# Patient Record
Sex: FEMALE | Race: WHITE | NOT HISPANIC OR LATINO | Employment: FULL TIME
[De-identification: names, ages, dates, MRNs, and addresses within clinical notes are randomized per-mention and may not be internally consistent; named-entity substitution may affect disease eponyms.]

---

## 2017-07-01 ENCOUNTER — HEALTH MAINTENANCE LETTER (OUTPATIENT)
Age: 47
End: 2017-07-01

## 2018-03-28 ENCOUNTER — OFFICE VISIT (OUTPATIENT)
Dept: FAMILY MEDICINE | Facility: CLINIC | Age: 48
End: 2018-03-28
Payer: COMMERCIAL

## 2018-03-28 VITALS
DIASTOLIC BLOOD PRESSURE: 74 MMHG | BODY MASS INDEX: 28.65 KG/M2 | OXYGEN SATURATION: 95 % | HEIGHT: 64 IN | RESPIRATION RATE: 16 BRPM | SYSTOLIC BLOOD PRESSURE: 102 MMHG | WEIGHT: 167.8 LBS | HEART RATE: 84 BPM | TEMPERATURE: 98.3 F

## 2018-03-28 DIAGNOSIS — R23.2 HOT FLASHES: ICD-10-CM

## 2018-03-28 DIAGNOSIS — Z00.00 ENCOUNTER FOR ROUTINE ADULT HEALTH EXAMINATION WITHOUT ABNORMAL FINDINGS: ICD-10-CM

## 2018-03-28 DIAGNOSIS — Z23 NEED FOR PROPHYLACTIC VACCINATION WITH TETANUS-DIPHTHERIA (TD): Primary | ICD-10-CM

## 2018-03-28 DIAGNOSIS — R21 RASH: ICD-10-CM

## 2018-03-28 LAB
ALBUMIN SERPL-MCNC: 3.9 G/DL (ref 3.4–5)
ALP SERPL-CCNC: 64 U/L (ref 40–150)
ALT SERPL W P-5'-P-CCNC: 24 U/L (ref 0–50)
ANION GAP SERPL CALCULATED.3IONS-SCNC: 8 MMOL/L (ref 3–14)
AST SERPL W P-5'-P-CCNC: 17 U/L (ref 0–45)
BILIRUB SERPL-MCNC: 0.6 MG/DL (ref 0.2–1.3)
BUN SERPL-MCNC: 7 MG/DL (ref 7–30)
CALCIUM SERPL-MCNC: 8.3 MG/DL (ref 8.5–10.1)
CHLORIDE SERPL-SCNC: 106 MMOL/L (ref 94–109)
CHOLEST SERPL-MCNC: 156 MG/DL
CO2 SERPL-SCNC: 25 MMOL/L (ref 20–32)
CREAT SERPL-MCNC: 0.88 MG/DL (ref 0.52–1.04)
GFR SERPL CREATININE-BSD FRML MDRD: 68 ML/MIN/1.7M2
GLUCOSE SERPL-MCNC: 93 MG/DL (ref 70–99)
HDLC SERPL-MCNC: 55 MG/DL
LDLC SERPL CALC-MCNC: 87 MG/DL
NONHDLC SERPL-MCNC: 101 MG/DL
POTASSIUM SERPL-SCNC: 3.8 MMOL/L (ref 3.4–5.3)
PROT SERPL-MCNC: 7.4 G/DL (ref 6.8–8.8)
SODIUM SERPL-SCNC: 139 MMOL/L (ref 133–144)
TRIGL SERPL-MCNC: 71 MG/DL
TSH SERPL DL<=0.005 MIU/L-ACNC: 1.93 MU/L (ref 0.4–4)

## 2018-03-28 PROCEDURE — 90471 IMMUNIZATION ADMIN: CPT | Performed by: FAMILY MEDICINE

## 2018-03-28 PROCEDURE — 90715 TDAP VACCINE 7 YRS/> IM: CPT | Performed by: FAMILY MEDICINE

## 2018-03-28 PROCEDURE — 99213 OFFICE O/P EST LOW 20 MIN: CPT | Mod: 25 | Performed by: FAMILY MEDICINE

## 2018-03-28 PROCEDURE — 84443 ASSAY THYROID STIM HORMONE: CPT | Performed by: FAMILY MEDICINE

## 2018-03-28 PROCEDURE — 99396 PREV VISIT EST AGE 40-64: CPT | Mod: 25 | Performed by: FAMILY MEDICINE

## 2018-03-28 PROCEDURE — 36415 COLL VENOUS BLD VENIPUNCTURE: CPT | Performed by: FAMILY MEDICINE

## 2018-03-28 PROCEDURE — 80053 COMPREHEN METABOLIC PANEL: CPT | Performed by: FAMILY MEDICINE

## 2018-03-28 PROCEDURE — 80061 LIPID PANEL: CPT | Performed by: FAMILY MEDICINE

## 2018-03-28 RX ORDER — EMOLLIENT BASE
CREAM (GRAM) TOPICAL PRN
Qty: 100 G | Refills: 1 | Status: SHIPPED | OUTPATIENT
Start: 2018-03-28 | End: 2021-12-20

## 2018-03-28 RX ORDER — MUPIROCIN 20 MG/G
OINTMENT TOPICAL
Qty: 30 G | Refills: 1 | Status: SHIPPED | OUTPATIENT
Start: 2018-03-28 | End: 2018-08-28

## 2018-03-28 NOTE — NURSING NOTE
"Chief Complaint   Patient presents with     Physical     Initial /74  Pulse 84  Temp 98.3  F (36.8  C) (Tympanic)  Resp 16  Ht 5' 4.01\" (1.626 m)  Wt 167 lb 12.8 oz (76.1 kg)  LMP 03/28/2017 (Approximate)  SpO2 95%  Breastfeeding? No  BMI 28.79 kg/m2 Estimated body mass index is 28.79 kg/(m^2) as calculated from the following:    Height as of this encounter: 5' 4.01\" (1.626 m).    Weight as of this encounter: 167 lb 12.8 oz (76.1 kg).  BP completed using cuff size: regular. L  arm       Health Maintenance that is potentially due pending provider review:   NONE       Haleigh Mueller CMA     "

## 2018-03-28 NOTE — PROGRESS NOTES
SUBJECTIVE:   CC: David Oro is an 47 year old woman who presents for preventive health visit.     Physical   Annual:     Getting at least 3 servings of Calcium per day::  Yes    Bi-annual eye exam::  Yes    Dental care twice a year::  Yes    Sleep apnea or symptoms of sleep apnea::  None and Daytime drowsiness    Diet::  Regular (no restrictions)    Frequency of exercise::  1 day/week    Duration of exercise::  Less than 15 minutes    Taking medications regularly::  Yes    Medication side effects::  Not applicable    Additional concerns today::  YES              Rash on the dorsal part of her hands gotten worse in the winter , she does have a dry skin and bad on the back of hands   She has been using lotions and now recently started Neosporin Abx not getting better   Does not itch but does get red and painful at times  Worse on the left hand dorsum   2) hot flashes , she does have a GYN where she does her PAP and breast exam and also a mammogram, all normal and done in 2016 so not due until 2019   She had a LMP of March 2017 and since it has been a yr she is done , she does have hot flashes when she is stressed out     Today's PHQ-2 Score:   PHQ-2 ( 1999 [employer]) 11/16/2016   Q1: Little interest or pleasure in doing things 0   Q2: Feeling down, depressed or hopeless 0   PHQ-2 Score 0       Abuse: Current or Past(Physical, Sexual or Emotional)- No  Do you feel safe in your environment - Yes    Social History   Substance Use Topics     Smoking status: Former Smoker     Quit date: 1/1/2000     Smokeless tobacco: Never Used     Alcohol use 0.0 oz/week     0 Standard drinks or equivalent per week      Comment: 1x month      No flowsheet data found.    Reviewed orders with patient.  Reviewed health maintenance and updated orders accordingly - Yes  Labs reviewed in EPIC  BP Readings from Last 3 Encounters:   03/28/18 102/74   12/02/16 96/68   02/18/14 116/76    Wt Readings from Last 3 Encounters:    03/28/18 167 lb 12.8 oz (76.1 kg)   12/02/16 147 lb 9.6 oz (67 kg)   02/18/14 161 lb (73 kg)                  Patient Active Problem List   Diagnosis     Tremor of both hands     Hot flashes     Chronic idiopathic constipation     Past Surgical History:   Procedure Laterality Date     dilation and curretage  8/2/04    UTERINE POLYP REMOVED; PATH: BENIGN POLYP,PROLIFERATIVE ENDOMETRIUM     ENDOMETRIAL SAMPLING (BIOPSY)  9/21/10    SECRETORY ENDOMETRIUM     HYSTEROSCOPY  8/2/04    UTERINE POLYP REMOVED       Social History   Substance Use Topics     Smoking status: Former Smoker     Quit date: 1/1/2000     Smokeless tobacco: Never Used     Alcohol use 0.0 oz/week     0 Standard drinks or equivalent per week      Comment: 1x month      Family History   Problem Relation Age of Onset     DIABETES Paternal Grandmother      Coronary Artery Disease Paternal Grandmother      Hypertension Paternal Grandmother      Hypertension Mother      OSTEOPOROSIS Mother      Hypertension Father      Hypertension Maternal Grandfather      Hypertension Paternal Grandfather      Other Cancer Maternal Grandmother      cervical     Uterine Cancer Maternal Grandmother          Current Outpatient Prescriptions   Medication Sig Dispense Refill     emollient (VANICREAM) cream Apply topically as needed for other 100 g 1     mupirocin (BACTROBAN) 2 % ointment Apply three times daily on the rash for five to 7 days 30 g 1     No Known Allergies  Recent Labs   Lab Test  11/16/16   1356   LDL  63   HDL  69   TRIG  72   CR  0.80   GFRESTIMATED  76   GFRESTBLACK  >90   GFR Calc     POTASSIUM  3.9   TSH  1.91        Patient under age 50, mutual decision reflected in health maintenance.      Pertinent mammograms are reviewed under the imaging tab.  History of abnormal Pap smear: NO - age 30- 65 PAP every 3 years recommended    Reviewed and updated as needed this visit by clinical staff         Reviewed and updated as needed this visit by  Provider        Past Medical History:   Diagnosis Date     Chronic idiopathic constipation 12/2/2016     DUB (dysfunctional uterine bleeding) 2010     Hot flashes 12/2/2016     Leiomyoma of uterus 2/26/09    POSTERIOR FIBROID 1.5 X 1.1 X 0.8 CM     RhD negative      Tremor of both hands 12/2/2016     Uterine polyp 8/2/04    HAD A HYSTEROSCOPY , D AND C FOR A UTERINE POLYP      Past Surgical History:   Procedure Laterality Date     dilation and curretage  8/2/04    UTERINE POLYP REMOVED; PATH: BENIGN POLYP,PROLIFERATIVE ENDOMETRIUM     ENDOMETRIAL SAMPLING (BIOPSY)  9/21/10    SECRETORY ENDOMETRIUM     HYSTEROSCOPY  8/2/04    UTERINE POLYP REMOVED       Review of Systems  C: NEGATIVE for fever, chills, change in weight  I: NEGATIVE for worrisome rashes, moles or lesions  E: NEGATIVE for vision changes or irritation  ENT: NEGATIVE for ear, mouth and throat problems  R: NEGATIVE for significant cough or SOB  B: NEGATIVE for masses, tenderness or discharge  CV: NEGATIVE for chest pain, palpitations or peripheral edema  GI: NEGATIVE for nausea, abdominal pain, heartburn, or change in bowel habits  : NEGATIVE for unusual urinary or vaginal symptoms. No vaginal bleeding.  M: NEGATIVE for significant arthralgias or myalgia  N: NEGATIVE for weakness, dizziness or paresthesias  P: NEGATIVE for changes in mood or affect      OBJECTIVE:   There were no vitals taken for this visit.  Physical Exam  GENERAL: healthy, alert and no distress  EYES: Eyes grossly normal to inspection, PERRL and conjunctivae and sclerae normal  HENT: ear canals and TM's normal, nose and mouth without ulcers or lesions  NECK: no adenopathy, no asymmetry, masses, or scars and thyroid normal to palpation  RESP: lungs clear to auscultation - no rales, rhonchi or wheezes  CV: regular rate and rhythm, normal S1 S2, no S3 or S4, no murmur, click or rub, no peripheral edema and peripheral pulses strong  ABDOMEN: soft, nontender, no hepatosplenomegaly, no  "masses and bowel sounds normal  MS: no gross musculoskeletal defects noted, no edema  NEURO: Normal strength and tone, mentation intact and speech normal  PSYCH: mentation appears normal, affect normal/bright  LYMPH: no cervical, supraclavicular, axillary, or inguinal adenopathy    ASSESSMENT/PLAN:   1. Encounter for routine adult health examination without abnormal findings  Discussed diet,calcium,exercise.Went over self breast exam.Thin prep was  NOT done.Eyes and teeth UTD.No immunizations needed today.See orders below for tests ordered and screening needed.    - Lipid Profile (Chol, Trig, HDL, LDL calc)  - TSH with free T4 reflex  - TDAP VACCINE (ADACEL)    2. Need for prophylactic vaccination with tetanus-diphtheria (TD)  She got her tetanus shot today .    3. Rash  We discussed using Vanicream for the lubrication after bath and also daily or up to twice a day , then using the topical Bactroban over the rash two to three x a day and if not better in 7 to 10 days to let me know then I will rx a TMC cream  - emollient (VANICREAM) cream; Apply topically as needed for other  Dispense: 100 g; Refill: 1  - mupirocin (BACTROBAN) 2 % ointment; Apply three times daily on the rash for five to 7 days  Dispense: 30 g; Refill: 1    4. Hot flashes  Still there and certain occasions will trigger them when she is stressed out usually , but LMP was over a yr ago , March of 2017  So she is menopausal   - Comprehensive metabolic panel (BMP + Alb, Alk Phos, ALT, AST, Total. Bili, TP)    COUNSELING:  Reviewed preventive health counseling, as reflected in patient instructions       Regular exercise       Healthy diet/nutrition       Vision screening       Osteoporosis Prevention/Bone Health       (Hilary)menopause management         reports that she quit smoking about 18 years ago. She has never used smokeless tobacco.    Estimated body mass index is 25.74 kg/(m^2) as calculated from the following:    Height as of 12/2/16: 5' 3.5\" " (1.613 m).    Weight as of 12/2/16: 147 lb 9.6 oz (67 kg).       Counseling Resources:  ATP IV Guidelines  Pooled Cohorts Equation Calculator  Breast Cancer Risk Calculator  FRAX Risk Assessment  ICSI Preventive Guidelines  Dietary Guidelines for Americans, 2010  USDA's MyPlate  ASA Prophylaxis  Lung CA Screening    Kenna Soto MD  [facility]  Answers for HPI/ROS submitted by the patient on 3/28/2018   PHQ-2 Score: 0

## 2018-05-02 ENCOUNTER — RADIANT APPOINTMENT (OUTPATIENT)
Dept: MAMMOGRAPHY | Facility: CLINIC | Age: 48
End: 2018-05-02
Payer: COMMERCIAL

## 2018-05-02 ENCOUNTER — OFFICE VISIT (OUTPATIENT)
Dept: OBGYN | Facility: CLINIC | Age: 48
End: 2018-05-02
Payer: COMMERCIAL

## 2018-05-02 VITALS
BODY MASS INDEX: 29.88 KG/M2 | DIASTOLIC BLOOD PRESSURE: 84 MMHG | HEIGHT: 64 IN | WEIGHT: 175 LBS | SYSTOLIC BLOOD PRESSURE: 122 MMHG

## 2018-05-02 DIAGNOSIS — Z12.31 VISIT FOR SCREENING MAMMOGRAM: ICD-10-CM

## 2018-05-02 DIAGNOSIS — Z01.419 ENCOUNTER FOR GYNECOLOGICAL EXAMINATION WITHOUT ABNORMAL FINDING: Primary | ICD-10-CM

## 2018-05-02 DIAGNOSIS — R63.5 WEIGHT GAIN: ICD-10-CM

## 2018-05-02 DIAGNOSIS — R23.2 HOT FLASHES: ICD-10-CM

## 2018-05-02 PROCEDURE — 87624 HPV HI-RISK TYP POOLED RSLT: CPT | Performed by: OBSTETRICS & GYNECOLOGY

## 2018-05-02 PROCEDURE — 99396 PREV VISIT EST AGE 40-64: CPT | Performed by: OBSTETRICS & GYNECOLOGY

## 2018-05-02 PROCEDURE — 77063 BREAST TOMOSYNTHESIS BI: CPT | Mod: TC

## 2018-05-02 PROCEDURE — 77067 SCR MAMMO BI INCL CAD: CPT | Mod: TC

## 2018-05-02 PROCEDURE — G0145 SCR C/V CYTO,THINLAYER,RESCR: HCPCS | Performed by: OBSTETRICS & GYNECOLOGY

## 2018-05-02 ASSESSMENT — ANXIETY QUESTIONNAIRES
GAD7 TOTAL SCORE: 4
2. NOT BEING ABLE TO STOP OR CONTROL WORRYING: SEVERAL DAYS
1. FEELING NERVOUS, ANXIOUS, OR ON EDGE: SEVERAL DAYS
5. BEING SO RESTLESS THAT IT IS HARD TO SIT STILL: NOT AT ALL
7. FEELING AFRAID AS IF SOMETHING AWFUL MIGHT HAPPEN: NOT AT ALL
IF YOU CHECKED OFF ANY PROBLEMS ON THIS QUESTIONNAIRE, HOW DIFFICULT HAVE THESE PROBLEMS MADE IT FOR YOU TO DO YOUR WORK, TAKE CARE OF THINGS AT HOME, OR GET ALONG WITH OTHER PEOPLE: NOT DIFFICULT AT ALL
6. BECOMING EASILY ANNOYED OR IRRITABLE: SEVERAL DAYS
3. WORRYING TOO MUCH ABOUT DIFFERENT THINGS: SEVERAL DAYS

## 2018-05-02 ASSESSMENT — PATIENT HEALTH QUESTIONNAIRE - PHQ9: 5. POOR APPETITE OR OVEREATING: NOT AT ALL

## 2018-05-02 NOTE — MR AVS SNAPSHOT
"              After Visit Summary   5/2/2018    David Oro    MRN: [id number]           Patient Information     Date Of Birth          1970        Visit Information        Provider Department      5/2/2018 3:00 PM Becky Galvez MD [facility]        Today's Diagnoses     Encounter for gynecological examination without abnormal finding    -  1    Hot flashes        Weight gain           Follow-ups after your visit        Who to contact     If you have questions or need follow up information about today's clinic visit or your schedule please contact [facility] directly at [phone].  Normal or non-critical lab and imaging results will be communicated to you by MyChart, letter or phone within 4 business days after the clinic has received the results. If you do not hear from us within 7 days, please contact the clinic through [employer] or phone. If you have a critical or abnormal lab result, we will notify you by phone as soon as possible.  Submit refill requests through [employer] or call your pharmacy and they will forward the refill request to us. Please allow 3 business days for your refill to be completed.          Additional Information About Your Visit        MyChart Information     [employer] gives you secure access to your electronic health record. If you see a primary care provider, you can also send messages to your care team and make appointments. If you have questions, please call your primary care clinic.  If you do not have a primary care provider, please call [phone] and they will assist you.        Care EveryWhere ID     This is your Care EveryWhere ID. This could be used by other organizations to access your [place] medical records  [id number]        Your Vitals Were     Height Last Period Breastfeeding? BMI (Body Mass Index)          5' 4\" (1.626 m) 08/01/2017 (Approximate) No 30.04 kg/m2         Blood Pressure from Last 3 Encounters: "   05/02/18 122/84   03/28/18 102/74   12/02/16 96/68    Weight from Last 3 Encounters:   05/02/18 175 lb (79.4 kg)   03/28/18 167 lb 12.8 oz (76.1 kg)   12/02/16 147 lb 9.6 oz (67 kg)              We Performed the Following     HPV High Risk Types DNA Cervical     Pap imaged thin layer screen with HPV - recommended age 30 - 65        Primary Care Provider Office Phone # Fax #    Kenna Soto [phone]       [address] [facility] [place] [id number]        Equal Access to Services     [facility] TRAV : Hadii teresa Weston, wasadeda gwendolyn, qaybta kaalmada gurjit, pierce lewis . [facility] [place] -[phone].    ATENCIÓN: Si habla español, tiene a colón disposición servicios gratuitos de asistencia lingüística. [facility] [phone].    We comply with applicable federal civil rights laws and [place] laws. We do not discriminate on the basis of race, color, national origin, age, disability, sex, sexual orientation, or gender identity.            Thank you!     Thank you for choosing [facility]  for your care. Our goal is always to provide you with excellent care. Hearing back from our patients is one way we can continue to improve our services. Please take a few minutes to complete the written survey that you may receive in the mail after your visit with us. Thank you!             Your Updated Medication List - Protect others around you: Learn how to safely use, store and throw away your medicines at www.disposemymeds.org.          This list is accurate as of 5/2/18 11:59 PM.  Always use your most recent med list.                   Brand Name Dispense Instructions for use Diagnosis    emollient cream     100 g    Apply topically as needed for other    Rash       mupirocin 2 % ointment    BACTROBAN    30 g    Apply three times daily on the rash for five to 7 days    Rash

## 2018-05-02 NOTE — LETTER
May 15, 2018    David Oro  [address] [place] [address]    Dear David,  We are happy to inform you that your PAP smear result from 5/2/18 is normal.  We are now able to do a follow up test on PAP smears. The DNA test is for HPV (Human Papilloma Virus). Cervical cancer is closely linked with certain types of HPV. Your results showed no evidence of high risk HPV.  Therefore we recommend you return in 3 years for your next pap smear.  You will still need to return to the clinic every year for an annual exam and other preventive tests.  Please contact the clinic at [phone] with any questions.  Sincerely,    Becky Galvez MD/kelsey

## 2018-05-02 NOTE — PROGRESS NOTES
David is a 47 year old  female who presents for annual exam.     Besides routine health maintenance, she has no other health concerns today .    HPI:  The patient's PCP is Kenna Soto MD.    Patient is doing well overall but has gained quite a bit of weight in the last year. Almost 20#  Periods have also been very rare in this last year. Had one in April just last month but prior to that was 2017. Prior to that were maybe every 3 months or so  Getting some hotflashes. Definitely notices them if she's exerting herself or if she gets stressed. Can usually resolve within just a few seconds. Tolerable. Not many at night. No vaginal dryness issues.    Just had fasting labs with PCP a month or so ago and despite weight gain lipids and glucose look great    Has mammo today    Daughter is in 10th grade. About to get her license. Is doing really well and a good kid      GYNECOLOGIC HISTORY:    Patient's last menstrual period was 2017 (approximate). 2018  Her current contraception method is: none.  She  reports that she quit smoking about 18 years ago. She has never used smokeless tobacco.    Patient is sexually active.  STD testing offered?  Declined  Last PHQ-9 score on record =   PHQ-9 SCORE 2018   Total Score 5     Last GAD7 score on record =   JUAN ALBERTO-7 SCORE 2018   Total Score 4     Alcohol Score = 1    HEALTH MAINTENANCE:  Cholesterol:   Cholesterol   Date Value Ref Range Status   2018 156 <200 mg/dL Final   2016 146 <200 mg/dL Final    3/28/18 Total= 156, Triglycerides=71, HDL=55, LDL=87, FBS=93, TSH=1.93  Last Mammo: one year ago, Result: normal, Next Mammo: today   Pap: (  Lab Results   Component Value Date    PAP Negative 2014 WNL HPV (-)neg  Colonoscopy:  NA, Result: not applicable, Next Colonoscopy: NA years.  Dexa:  NA    Health maintenance updated:  yes    HISTORY:  Obstetric History       T1      L1     SAB0   TAB0   Ectopic0    "Multiple0   Live Births1       # Outcome Date GA Lbr Raciel/2nd Weight Sex Delivery Anes PTL Lv   1 Term 01 37w0d  6 lb 2 oz (2.778 kg) F    TISH      Name: Analisa          Patient Active Problem List   Diagnosis     Tremor of both hands     Hot flashes     Chronic idiopathic constipation     Past Surgical History:   Procedure Laterality Date     dilation and curretage  04    UTERINE POLYP REMOVED; PATH: BENIGN POLYP,PROLIFERATIVE ENDOMETRIUM     ENDOMETRIAL SAMPLING (BIOPSY)  9/21/10    SECRETORY ENDOMETRIUM     HYSTEROSCOPY  04    UTERINE POLYP REMOVED      Social History   Substance Use Topics     Smoking status: Former Smoker     Quit date: 2000     Smokeless tobacco: Never Used     Alcohol use 0.0 oz/week     0 Standard drinks or equivalent per week      Comment: 1x month       Problem (# of Occurrences) Relation (Name,Age of Onset)    Coronary Artery Disease (1) Paternal Grandmother    DIABETES (1) Paternal Grandmother    Hypertension (5) Paternal Grandmother, Mother, Father, Maternal Grandfather, Paternal Grandfather    OSTEOPOROSIS (1) Mother    Other Cancer (1) Maternal Grandmother: cervical    Uterine Cancer (1) Maternal Grandmother            Current Outpatient Prescriptions   Medication Sig     emollient (VANICREAM) cream Apply topically as needed for other     mupirocin (BACTROBAN) 2 % ointment Apply three times daily on the rash for five to 7 days     No current facility-administered medications for this visit.      No Known Allergies    Past medical, surgical, social and family histories were reviewed and updated in [facility].    ROS:   Constitutional: Weight Gain  Eyes: Vision Loss  Breast: Tenderness  Cardiovascular: Palpitations  Gastrointestinal: Abdominal Pain, Bloating, Blood in Stools and Constipation  Genitourinary: Irregular Menses  Skin: Skin Dryness  Neurologic: Headaches  Endocrine: Decreased Libido, Excess Hair Growth and Loss of Hair    EXAM:  /84  Ht 5' 4\" (1.626 " m)  Wt 175 lb (79.4 kg)  LMP 08/01/2017 (Approximate)  Breastfeeding? No  BMI 30.04 kg/m2   BMI: Body mass index is 30.04 kg/(m^2).    PHYSICAL EXAM:  Constitutional:  Appearance: Well nourished, well developed, alert, in no acute distress  Neck:  Lymph Nodes:  No lymphadenopathy present    Thyroid:  Gland size normal, nontender, no nodules or masses present  on palpation  Chest:  Respiratory Effort:  Breathing unlabored  Cardiovascular:    Heart: Auscultation:  Regular rate, normal rhythm, no murmurs present  Breasts: Palpation of Breasts and Axillae:  No masses present on palpation, no breast tenderness. and No nodularity, asymmetry or nipple discharge bilaterally.  Gastrointestinal:   Abdominal Examination:  Abdomen nontender to palpation, tone normal without rigidity or guarding, no masses present, umbilicus without lesions   Liver and Spleen:  No hepatomegaly present, liver nontender to palpation    Hernias:  No hernias present  Lymphatic: Lymph Nodes:  No other lymphadenopathy present  Skin:  General Inspection:  No rashes present, no lesions present, no areas of  discoloration    Genitalia and Groin:  No rashes present, no lesions present, no areas of  discoloration, no masses present  Neurologic/Psychiatric:    Mental Status:  Oriented X3     Pelvic Exam:  External Genitalia:     Normal appearance for age, no discharge present, no tenderness present, no inflammatory lesions present, color normal  Vagina:     Normal vaginal vault without central or paravaginal defects, no discharge present, no inflammatory lesions present, no masses present  Bladder:     Nontender to palpation  Urethra:   Urethral Body:  Urethra palpation normal, urethra structural support normal   Urethral Meatus:  No erythema or lesions present  Cervix:     Appearance healthy, no lesions present, nontender to palpation, no bleeding present  Uterus:     Uterus: firm, normal sized and nontender, anteverted in position.   Adnexa:     No  adnexal tenderness present, no adnexal masses present  Perineum:     Perineum within normal limits, no evidence of trauma, no rashes or skin lesions present  Anus:     Anus within normal limits, no hemorrhoids present  Inguinal Lymph Nodes:     No lymphadenopathy present  Pubic Hair:     Normal pubic hair distribution for age  Genitalia and Groin:     No rashes present, no lesions present, no areas of discoloration, no masses present      COUNSELING:   Reviewed preventive health counseling, as reflected in patient instructions  Special attention given to:        Regular exercise       Healthy diet/nutrition       (Hilary)menopause management    BMI: Body mass index is 30.04 kg/(m^2).  Weight management plan: Patient was referred to their PCP to discuss a diet and exercise plan.    ASSESSMENT:  47 year old female with satisfactory annual exam.    ICD-10-CM    1. Encounter for gynecological examination without abnormal finding Z01.419 Pap imaged thin layer screen with HPV - recommended age 30 - 65     HPV High Risk Types DNA Cervical   2. Hot flashes R23.2    3. Weight gain R63.5        PLAN:  Pap and cotesting today    mammo today    Discussed menopause and perimenopause and what types of things to expect. Discussed HRT and the r/b/a. Does not usually seem to curb appetite or slow the weight gain even with HRT though as that's her biggest complaint and can live with the vasomotor sx.    Discussed diet and exercise and metabolic rate in this time period, etc.        Becky Galvez MD

## 2018-05-03 ASSESSMENT — PATIENT HEALTH QUESTIONNAIRE - PHQ9: SUM OF ALL RESPONSES TO PHQ QUESTIONS 1-9: 5

## 2018-05-03 ASSESSMENT — ANXIETY QUESTIONNAIRES: GAD7 TOTAL SCORE: 4

## 2018-05-07 LAB
COPATH REPORT: NORMAL
PAP: NORMAL

## 2018-05-10 LAB
FINAL DIAGNOSIS: NORMAL
HPV HR 12 DNA CVX QL NAA+PROBE: NEGATIVE
HPV16 DNA SPEC QL NAA+PROBE: NEGATIVE
HPV18 DNA SPEC QL NAA+PROBE: NEGATIVE
SPECIMEN DESCRIPTION: NORMAL
SPECIMEN SOURCE CVX/VAG CYTO: NORMAL

## 2018-08-28 ENCOUNTER — RADIANT APPOINTMENT (OUTPATIENT)
Dept: GENERAL RADIOLOGY | Facility: CLINIC | Age: 48
End: 2018-08-28
Attending: FAMILY MEDICINE
Payer: COMMERCIAL

## 2018-08-28 ENCOUNTER — OFFICE VISIT (OUTPATIENT)
Dept: FAMILY MEDICINE | Facility: CLINIC | Age: 48
End: 2018-08-28
Payer: COMMERCIAL

## 2018-08-28 VITALS
DIASTOLIC BLOOD PRESSURE: 72 MMHG | HEART RATE: 66 BPM | TEMPERATURE: 98.3 F | SYSTOLIC BLOOD PRESSURE: 104 MMHG | HEIGHT: 64 IN | WEIGHT: 171.1 LBS | OXYGEN SATURATION: 98 % | BODY MASS INDEX: 29.21 KG/M2

## 2018-08-28 DIAGNOSIS — M79.644 PAIN OF FINGER OF RIGHT HAND: Primary | ICD-10-CM

## 2018-08-28 DIAGNOSIS — H60.91 INFLAMMATION OF RIGHT EXTERNAL EAR: ICD-10-CM

## 2018-08-28 DIAGNOSIS — M79.644 PAIN OF FINGER OF RIGHT HAND: ICD-10-CM

## 2018-08-28 PROCEDURE — 73140 X-RAY EXAM OF FINGER(S): CPT | Mod: RT

## 2018-08-28 PROCEDURE — 99214 OFFICE O/P EST MOD 30 MIN: CPT | Performed by: FAMILY MEDICINE

## 2018-08-28 RX ORDER — NEOMYCIN SULFATE, POLYMYXIN B SULFATE AND HYDROCORTISONE 10; 3.5; 1 MG/ML; MG/ML; [USP'U]/ML
4 SUSPENSION/ DROPS AURICULAR (OTIC) 4 TIMES DAILY
Qty: 10 ML | Refills: 0 | Status: SHIPPED | OUTPATIENT
Start: 2018-08-28 | End: 2021-12-20

## 2018-08-28 NOTE — PROGRESS NOTES
SUBJECTIVE:   David Oro is a 48 year old female who presents to clinic today for the following health issues:      Musculoskeletal problem/pain- fourth finger of the right hand , has been happening for the past two month , whenever she tries to hold an object in her hand that finger gets a stabbing sharp pain, some weakness also and she is worried she might drop the object- this has been consistantly happening for months. No injuries , no redness but at times she has seen edema in that finger   2) whenever she walks she would get popping in the right ear , always , no pain but may be some muffled tones, hearing is fine       Duration: Intermittent x 1 month    Description  Location: Right hand    Intensity:  moderate    Accompanying signs and symptoms: none    History  Previous similar problem: no   Previous evaluation:  none    Precipitating or alleviating factors:  Trauma or overuse: no   Aggravating factors include: none    Therapies tried and outcome: nothing          Problem list and histories reviewed & adjusted, as indicated.  Additional history: as documented    Patient Active Problem List   Diagnosis     Tremor of both hands     Hot flashes     Chronic idiopathic constipation     Past Surgical History:   Procedure Laterality Date     dilation and curretage  8/2/04    UTERINE POLYP REMOVED; PATH: BENIGN POLYP,PROLIFERATIVE ENDOMETRIUM     ENDOMETRIAL SAMPLING (BIOPSY)  9/21/10    SECRETORY ENDOMETRIUM     HYSTEROSCOPY  8/2/04    UTERINE POLYP REMOVED       Social History   Substance Use Topics     Smoking status: Former Smoker     Quit date: 1/1/2000     Smokeless tobacco: Never Used     Alcohol use 0.0 oz/week     0 Standard drinks or equivalent per week      Comment: 1x month      Family History   Problem Relation Age of Onset     Diabetes Paternal Grandmother      Coronary Artery Disease Paternal Grandmother      Hypertension Paternal Grandmother      Hypertension Mother       "Osteoporosis Mother      Hypertension Father      Hypertension Maternal Grandfather      Hypertension Paternal Grandfather      Other Cancer Maternal Grandmother      cervical     Uterine Cancer Maternal Grandmother          Current Outpatient Prescriptions   Medication Sig Dispense Refill     neomycin-polymyxin-hydrocortisone (CORTISPORIN) 3.5-50442-2 otic suspension Place 4 drops into the right ear 4 times daily 10 mL 0     emollient (VANICREAM) cream Apply topically as needed for other 100 g 1     No Known Allergies  Recent Labs   Lab Test  03/28/18   1118  11/16/16   1356   LDL  87  63   HDL  55  69   TRIG  71  72   ALT  24   --    CR  0.88  0.80   GFRESTIMATED  68  76   GFRESTBLACK  83  >90   GFR Calc     POTASSIUM  3.8  3.9   TSH  1.93  1.91      BP Readings from Last 3 Encounters:   08/28/18 104/72   05/02/18 122/84   03/28/18 102/74    Wt Readings from Last 3 Encounters:   08/28/18 171 lb 1.6 oz (77.6 kg)   05/02/18 175 lb (79.4 kg)   03/28/18 167 lb 12.8 oz (76.1 kg)                  Labs reviewed in EPIC    Reviewed and updated as needed this visit by clinical staff       Reviewed and updated as needed this visit by Provider         ROS:  Constitutional, HEENT, cardiovascular, pulmonary, GI, , musculoskeletal, neuro, skin, endocrine and psych systems are negative, except as otherwise noted.    OBJECTIVE:     /72 (BP Location: Right arm, Cuff Size: Adult Large)  Pulse 66  Temp 98.3  F (36.8  C) (Oral)  Ht 5' 4\" (1.626 m)  Wt 171 lb 1.6 oz (77.6 kg)  LMP 08/01/2017 (Approximate)  SpO2 98%  BMI 29.37 kg/m2  Body mass index is 29.37 kg/(m^2).  GENERAL: healthy, alert and no distress  EYES: Eyes grossly normal to inspection, PERRL and conjunctivae and sclerae normal  HENT: normal cephalic/atraumatic, right ear: clear effusion and erythematous, nose and mouth without ulcers or lesions, oropharynx clear and oral mucous membranes moist  NECK: no adenopathy, no asymmetry, masses, or " scars and thyroid normal to palpation  RESP: lungs clear to auscultation - no rales, rhonchi or wheezes  CV: regular rate and rhythm, normal S1 S2, no S3 or S4, no murmur, click or rub, no peripheral edema and peripheral pulses strong  MS: no gross musculoskeletal defects noted, no edema EXCEPT the right ring finger there is some pain with palpation on the sides faye when flexing , there is no  finger and also no edema and no erythema , is able to flex it with some discomfort   NEURO: Normal strength and tone, mentation intact and speech normal    Diagnostic Test Results:  Results for orders placed or performed in visit on 05/02/18   Pap imaged thin layer screen with HPV - recommended age 30 - 65   Result Value Ref Range    PAP NIL     Copath Report         Patient Name: MAKAYLA ARANDA  MR#: [id number]  Specimen #: [id number]  Collected: 5/2/2018  Received: 5/4/2018  Reported: 5/7/2018 13:51  Ordering Phy(s): ADE WELLS    For improved result formatting, select 'View Enhanced Report Format' under   Linked Documents section.    SPECIMEN/STAIN PROCESS:  Pap imaged thin layer prep screening (Surepath, FocalPoint with guided   screening)       Pap-Cyto x 1, HPV ordered x 1    SOURCE: Cervical, endocervical  ----------------------------------------------------------------   Pap imaged thin layer prep screening (Surepath, FocalPoint with guided   screening)  SPECIMEN ADEQUACY:  Satisfactory for evaluation.  -Transformation zone component present.    CYTOLOGIC INTERPRETATION:    Negative for intraepithelial lesion or malignancy    Electronically signed out by:  EVA Chambers (ASCP)    Processed and screened at [facility],   [facility]    CLINICAL HISTORY:  LMP: 3/28/17  Previous norm al pap  Date of Last Pap: 1/1/14,    Papanicolaou Test Limitations:  Cervical cytology is a screening test with   limited sensitivity; regular  screening is critical for cancer  prevention; Pap tests are primarily   effective for the diagnosis/prevention of  squamous cell carcinoma, not adenocarcinomas or other cancers.    TESTING LAB LOCATION:  [address]  [address] [phone]    COLLECTION SITE:  Client:  [facility]  Location: [facility] (S)     HPV High Risk Types DNA Cervical   Result Value Ref Range    HPV Source SurePath     HPV 16 DNA Negative NEG^Negative    HPV 18 DNA Negative NEG^Negative    Other HR HPV Negative NEG^Negative    Final Diagnosis This patient's sample is negative for HPV DNA.     Specimen Description Cervical Cells        ASSESSMENT/PLAN:             1. Pain of finger of right hand  We discussed since she has been having weakness and pain whenever she has attampted grasping objects ( in the right ring finger ) , she has done ice and Ibuprofen and symtpoms persist , I have recommended and given her a referral for hand specialist . RTC if no improving or worsening.    - XR Finger Right G/E 2 Views; Future  - ORTHOPEDICS ADULT REFERRAL    2. Inflammation of right external ear  Will try using the ear drops for 5 to 7 days and if symptom persist , she would need to make an appointment with ENT.  - neomycin-polymyxin-hydrocortisone (CORTISPORIN) 3.5-46223-3 otic suspension; Place 4 drops into the right ear 4 times daily  Dispense: 10 mL; Refill: 0  - OTOLARYNGOLOGY REFERRAL    RTC if no improving or worsening.  Pt is aware  and comfortable with the current plan.      Kenna Soto MD  [facility]

## 2018-08-28 NOTE — MR AVS SNAPSHOT
After Visit Summary   8/28/2018    David Oro    MRN: [id number]           Patient Information     Date Of Birth          1970        Visit Information        Provider Department      8/28/2018 11:30 AM Kenna Soto MD [facility]        Today's Diagnoses     Pain of finger of right hand    -  1    Inflammation of right external ear           Follow-ups after your visit        Additional Services     ORTHOPEDICS ADULT REFERRAL       Your provider has referred you to: [facility] [place] [phone]   [email or web address]    Please be aware that coverage of these services is subject to the terms and limitations of your health insurance plan.  Call member services at your health plan with any benefit or coverage questions.      Please bring the following to your appointment:    >>   Any x-rays, CTs or MRIs which have been performed.  Contact the facility where they were done to arrange for  prior to your scheduled appointment.    >>   List of current medications   >>   This referral request   >>   Any documents/labs given to you for this referral            OTOLARYNGOLOGY REFERRAL       Your provider has referred you to: [facility]  [place] [phone]   [email or web address]    Please be aware that coverage of these services is subject to the terms and limitations of your health insurance plan.  Call member services at your health plan with any benefit or coverage questions.      Please bring the following with you to your appointment:    (1) Any X-Rays, CTs or MRIs which have been performed.  Contact the facility where they were done to arrange for  prior to your scheduled appointment.   (2) List of current medications  (3) This referral request   (4) Any documents/labs given to you for this referral                  Who to contact     If you have questions or need follow up information about today's  "clinic visit or your schedule please contact [facility] directly at [phone].  Normal or non-critical lab and imaging results will be communicated to you by MyChart, letter or phone within 4 business days after the clinic has received the results. If you do not hear from us within 7 days, please contact the clinic through [employer] or phone. If you have a critical or abnormal lab result, we will notify you by phone as soon as possible.  Submit refill requests through [employer]uffle or call your pharmacy and they will forward the refill request to us. Please allow 3 business days for your refill to be completed.          Additional Information About Your Visit        [employer] Health Information     Zuffle gives you secure access to your electronic health record. If you see a primary care provider, you can also send messages to your care team and make appointments. If you have questions, please call your primary care clinic.  If you do not have a primary care provider, please call [phone] and they will assist you.        Care EveryWhere ID     This is your Care EveryWhere ID. This could be used by other organizations to access your [facility] medical records  [id number]        Your Vitals Were     Pulse Temperature Height Last Period Pulse Oximetry BMI (Body Mass Index)    66 98.3  F (36.8  C) (Oral) 5' 4\" (1.626 m) 08/01/2017 (Approximate) 98% 29.37 kg/m2       Blood Pressure from Last 3 Encounters:   08/28/18 104/72   05/02/18 122/84   03/28/18 102/74    Weight from Last 3 Encounters:   08/28/18 171 lb 1.6 oz (77.6 kg)   05/02/18 175 lb (79.4 kg)   03/28/18 167 lb 12.8 oz (76.1 kg)              We Performed the Following     ORTHOPEDICS ADULT REFERRAL     OTOLARYNGOLOGY REFERRAL          Today's Medication Changes          These changes are accurate as of 8/28/18 12:19 PM.  If you have any questions, ask your nurse or doctor.               Start taking these medicines.        Dose/Directions    " neomycin-polymyxin-hydrocortisone 3.5-59404-4 otic suspension   Commonly known as:  CORTISPORIN   Used for:  Inflammation of right external ear   Started by:  Kenna Soto MD        Dose:  4 drop   Place 4 drops into the right ear 4 times daily   Quantity:  10 mL   Refills:  0            Where to get your medicines      These medications were sent to [facility] [address] [place] [address] [place], [place] - [address] AT [address] [place] [address] & [facility]  [address] [place] [address] [place] [address]    Hours:  24-hours Phone:  [phone]     neomycin-polymyxin-hydrocortisone 3.5-87483-8 otic suspension                Primary Care Provider Office Phone # Fax #    Kenna Soto [phone] [address] [facility]   [facility] [address]        Equal Access to Services     [facility]: Hadii teresa moreno hadasho Soomaali, waaxda luqadaha, qaybta kaalmada adeegyada, pierce carter hayirley lewis . So [facility] [phone].    ATENCIÓN: Si habla español, tiene a colón disposición servicios gratuitos de asistencia lingüística. [facility] [phone].    We comply with applicable federal civil rights laws and [place] laws. We do not discriminate on the basis of race, color, national origin, age, disability, sex, sexual orientation, or gender identity.            Thank you!     Thank you for choosing [facility]  for your care. Our goal is always to provide you with excellent care. Hearing back from our patients is one way we can continue to improve our services. Please take a few minutes to complete the written survey that you may receive in the mail after your visit with us. Thank you!             Your Updated Medication List - Protect others around you: Learn how to safely use, store and throw away your medicines at www.disposemymeds.org.          This list is accurate as of 8/28/18 12:19 PM.  Always use your most recent med list.                   Brand Name Dispense Instructions for use Diagnosis     emollient cream     100 g    Apply topically as needed for other    Rash       neomycin-polymyxin-hydrocortisone 3.5-29670-0 otic suspension    CORTISPORIN    10 mL    Place 4 drops into the right ear 4 times daily    Inflammation of right external ear

## 2020-02-08 ENCOUNTER — HEALTH MAINTENANCE LETTER (OUTPATIENT)
Age: 50
End: 2020-02-08

## 2020-03-05 NOTE — PROGRESS NOTES
David is a 49 year old  female who presents for annual exam.     Besides routine health maintenance, she has no other health concerns today .    HPI:  The patient's PCP is Nuria Mann MD.      Patient hasn't been seen here in 2 yrs. Does have a new PCP that she is seeing regularly though.    Has been feeling very fatigued for quite a while already and went in  to be checked and her GFR was low and creat was 1.22 for some reason. Had it repeated  and GFR was almost normal and creat was 1.06 or so. Improved but still not normal. Never had an explanation for why that happened but hasn't had it checked since and still feeling tired so not sure if that had anything to do with it.    Not sexually active in years since her  passed away.  Periods have been very irregular. Had documented by MA that had spotting  and hadn't had a period prior to that since  but that was not accurate.   Had a full 2 day normal period  and prior to that was end of /july and also a normal 2-3 day long period. Had all the PMS associated with it including breast tenderness and bloating with cramping and more irritable and tired for a few days and then got her periods. So knew it was coming and was exactly how all her other periods have been. Not heavy and only lasting a couple of days or so but just keeps waiting for it to just be done and not having to keep wondering when it will be    Has had nightsweats for years and those have actually gotten a bit better in the last year or two. However has more daytime hotflashes now. Feels them most every day though could miss a day or two here and there. Not nearly as intense as she knows some peoples' are so not drenched and sweating and fairly short lived and mild. Prefers not to do hormones if possible b/c it worries her.    Daughter is a senior this year. Going to [employer] and wants to major in astrophysics b/c had a great intro class to it in  and loved it.  Really sad about her only child moving out but understands she needs to and needs to become independent and do things for herself.  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 Feels like she does everythign more slowly than she used to. If preps for guests and is cooking/cleaning has to take much more time now b/c feels completely exhausted afterwards. If it's just her age she's fine with it but wants to make sure it's nothing else.    Patient is due for colonoscopy. PCP referred her to [address] [facility] so has to schedule that and is planning to.  Last mammo was almost 2 yrs ago so having that done today as well  Pap was just about 2 yrs ago and nil/neg      GYNECOLOGIC HISTORY:    LMP  and prior to that was end of /early 2019      Her current contraception method is: none.  She  reports that she quit smoking about 20 years ago. She has never used smokeless tobacco.    Patient is sexually active.  STD testing offered?  Declined  Last PHQ-9 score on record =   PHQ-9 SCORE 3/9/2020   PHQ-9 Total Score 7     Last GAD7 score on record =   JUAN ALBERTO-7 SCORE 3/9/2020   Total Score 6     Alcohol Score = 1    HEALTH MAINTENANCE:  Cholesterol:   Cholesterol   Date Value Ref Range Status   2018 156 <200 mg/dL Final   2016 146 <200 mg/dL Final      Last Mammo: 18, Result: Normal, Next Mammo: Today   Pap:   Lab Results   Component Value Date    PAP NIL HPV- 2018    PAP Negative 2014      Next Colonoscopy: Due but stated her PCP referred her to [facility] Dexa:  never    Health maintenance updated:  yes    HISTORY:  OB History    Para Term  AB Living   1 1 1 0 0 1   SAB TAB Ectopic Multiple Live Births   0 0 0 0 1      # Outcome Date GA Lbr Raciel/2nd Weight Sex Delivery Anes PTL Lv   1 Term 01 37w0d  2.778 kg (6 lb 2 oz) F    TISH      Name: Analisa       Patient Active  "Problem List   Diagnosis     Tremor of both hands     Hot flashes     Chronic idiopathic constipation     Past Surgical History:   Procedure Laterality Date     dilation and curretage  04    UTERINE POLYP REMOVED; PATH: BENIGN POLYP,PROLIFERATIVE ENDOMETRIUM     ENDOMETRIAL SAMPLING (BIOPSY)  9/21/10    SECRETORY ENDOMETRIUM     HYSTEROSCOPY  04    UTERINE POLYP REMOVED      Social History     Tobacco Use     Smoking status: Former Smoker     Last attempt to quit: 2000     Years since quittin.2     Smokeless tobacco: Never Used   Substance Use Topics     Alcohol use: Yes     Alcohol/week: 0.0 standard drinks     Comment: 1x month       Problem (# of Occurrences) Relation (Name,Age of Onset)    Coronary Artery Disease (1) Paternal Grandmother    Diabetes (1) Paternal Grandmother    Hypertension (5) Paternal Grandmother, Mother, Father, Maternal Grandfather, Paternal Grandfather    Osteoporosis (1) Mother    Other Cancer (1) Maternal Grandmother: cervical    Uterine Cancer (1) Maternal Grandmother            Current Outpatient Medications   Medication Sig     vitamin D3 (CHOLECALCIFEROL) 1.25 MG (79888 UT) capsule Take 1 capsule (50,000 Units) by mouth every 7 days for 8 doses     emollient (VANICREAM) cream Apply topically as needed for other     neomycin-polymyxin-hydrocortisone (CORTISPORIN) 3.5-29215-7 otic suspension Place 4 drops into the right ear 4 times daily     No current facility-administered medications for this visit.      No Known Allergies    Past medical, surgical, social and family histories were reviewed and updated in EPIC.    ROS:   12 point review of systems negative other than symptoms noted below or in the HPI.  Having some urinary retention when using the bathroom feels like there is more to come out has to wait a little. No urinary frequancy or dysuria, bladder or kidney problems    EXAM:  /72   Ht 1.626 m (5' 4\")   Wt 78.9 kg (174 lb)   LMP 2019   BMI 29.87 " kg/m     BMI: Body mass index is 29.87 kg/m .    PHYSICAL EXAM:  Constitutional:   Appearance: Well nourished, well developed, alert, in no acute distress  Neck:  Lymph Nodes:  No lymphadenopathy present    Thyroid:  Gland size normal, nontender, no nodules or masses present  on palpation  Chest:  Respiratory Effort:  Breathing unlabored  Cardiovascular:    Heart: Auscultation:  Regular rate, normal rhythm, no murmurs present  Breasts: Palpation of Breasts and Axillae:  No masses present on palpation, no breast tenderness. and No nodularity, asymmetry or nipple discharge bilaterally.  Gastrointestinal:   Abdominal Examination:  Abdomen nontender to palpation, tone normal without rigidity or guarding, no masses present, umbilicus without lesions   Liver and Spleen:  No hepatomegaly present, liver nontender to palpation    Hernias:  No hernias present  Lymphatic: Lymph Nodes:  No other lymphadenopathy present  Skin:  General Inspection:  No rashes present, no lesions present, no areas of  discoloration  Neurologic:    Mental Status:  Oriented X3.  Normal strength and tone, sensory exam                grossly normal, mentation intact and speech normal.    Psychiatric:   Mentation appears normal and affect normal/bright.         Pelvic Exam:  External Genitalia:     Normal appearance for age, no discharge present, no tenderness present, no inflammatory lesions present, color normal  Vagina:     Normal vaginal vault without central or paravaginal defects, no discharge present, no inflammatory lesions present, no masses present  Bladder:     Nontender to palpation  Urethra:   Urethral Body:  Urethra palpation normal, urethra structural support normal   Urethral Meatus:  No erythema or lesions present  Cervix:     Appearance healthy, no lesions present, nontender to palpation, no bleeding present  Uterus:     Uterus: firm, normal sized and nontender, anteverted in position.   Adnexa:     No adnexal tenderness present, no  adnexal masses present  Perineum:     Perineum within normal limits, no evidence of trauma, no rashes or skin lesions present  Anus:     Anus within normal limits, no hemorrhoids present  Inguinal Lymph Nodes:     No lymphadenopathy present  Pubic Hair:     Normal pubic hair distribution for age  Genitalia and Groin:     No rashes present, no lesions present, no areas of discoloration, no masses present      COUNSELING:   Reviewed preventive health counseling, as reflected in patient instructions  Special attention given to:        Regular exercise       Healthy diet/nutrition       Colon cancer screening       (Hilary)menopause management    BMI: Body mass index is 29.87 kg/m .  Weight management plan: Discussed healthy diet and exercise guidelines Patient was referred to their PCP to discuss a diet and exercise plan.    ASSESSMENT:  49 year old female with satisfactory annual exam.    ICD-10-CM    1. Encounter for gynecological examination without abnormal finding  Z01.419    2. Fatigue, unspecified type  R53.83 Vitamin D Deficiency     Basic metabolic panel   3. Elevated serum creatinine  R79.89 Basic metabolic panel   4. Irregular menses  N92.6    5. Vitamin D deficiency  E55.9 vitamin D3 (CHOLECALCIFEROL) 1.25 MG (32036 UT) capsule   6. Hot flashes  R23.2        PLAN:  Pap is UTD for 3 more years  mammo today and encouraged to do it every year and not every 2 yrs  Will repeat her bmp to f/u on creatinine/GFR since hadn't fully normalized at her last check and also will check a vitamin D as that could be contributing to fatigue as well.    Discussed perimenopause, irregular periods and vasomotor sx. Discussed the more intangible changes like fatigue, slowing down, less sharp memory and word recall and mood impacts.  Doesn't feel like she has depression or anxiety and again, prefers not to do HRT if can hold off  Encouraged to call and come in if ever does go a full year w/o menses and then has bleeding as that is  a sign of abnormality until proven otherwise.  Discussed that the role of hormone testing is very limited and w/o testing can gauarantee that she is perimenopausal with on average lower hormone rates which can also contribute to fatigue  Encouraged self care, adequate sleep and hydration, clean and healthy eating, and even 20-30 min of aeorbc exercise 4-5x/week purely for energy and stress relief rather than weight loss, although weight gain does also contribute to low energy and could help in that way as well    Becky Galvez MD

## 2020-03-09 ENCOUNTER — OFFICE VISIT (OUTPATIENT)
Dept: OBGYN | Facility: CLINIC | Age: 50
End: 2020-03-09
Payer: COMMERCIAL

## 2020-03-09 ENCOUNTER — ANCILLARY PROCEDURE (OUTPATIENT)
Dept: MAMMOGRAPHY | Facility: CLINIC | Age: 50
End: 2020-03-09
Payer: COMMERCIAL

## 2020-03-09 VITALS
BODY MASS INDEX: 29.71 KG/M2 | HEIGHT: 64 IN | DIASTOLIC BLOOD PRESSURE: 72 MMHG | WEIGHT: 174 LBS | SYSTOLIC BLOOD PRESSURE: 120 MMHG

## 2020-03-09 DIAGNOSIS — R79.89 ELEVATED SERUM CREATININE: ICD-10-CM

## 2020-03-09 DIAGNOSIS — Z12.31 VISIT FOR SCREENING MAMMOGRAM: ICD-10-CM

## 2020-03-09 DIAGNOSIS — R53.83 FATIGUE, UNSPECIFIED TYPE: ICD-10-CM

## 2020-03-09 DIAGNOSIS — E55.9 VITAMIN D DEFICIENCY: ICD-10-CM

## 2020-03-09 DIAGNOSIS — N92.6 IRREGULAR MENSES: ICD-10-CM

## 2020-03-09 DIAGNOSIS — R23.2 HOT FLASHES: ICD-10-CM

## 2020-03-09 DIAGNOSIS — Z01.419 ENCOUNTER FOR GYNECOLOGICAL EXAMINATION WITHOUT ABNORMAL FINDING: Primary | ICD-10-CM

## 2020-03-09 PROCEDURE — 77063 BREAST TOMOSYNTHESIS BI: CPT | Mod: TC

## 2020-03-09 PROCEDURE — 99396 PREV VISIT EST AGE 40-64: CPT | Performed by: OBSTETRICS & GYNECOLOGY

## 2020-03-09 PROCEDURE — 36415 COLL VENOUS BLD VENIPUNCTURE: CPT | Performed by: OBSTETRICS & GYNECOLOGY

## 2020-03-09 PROCEDURE — 80048 BASIC METABOLIC PNL TOTAL CA: CPT | Performed by: OBSTETRICS & GYNECOLOGY

## 2020-03-09 PROCEDURE — 77067 SCR MAMMO BI INCL CAD: CPT | Mod: TC

## 2020-03-09 PROCEDURE — 82306 VITAMIN D 25 HYDROXY: CPT | Performed by: OBSTETRICS & GYNECOLOGY

## 2020-03-09 ASSESSMENT — ANXIETY QUESTIONNAIRES
GAD7 TOTAL SCORE: 6
7. FEELING AFRAID AS IF SOMETHING AWFUL MIGHT HAPPEN: SEVERAL DAYS
IF YOU CHECKED OFF ANY PROBLEMS ON THIS QUESTIONNAIRE, HOW DIFFICULT HAVE THESE PROBLEMS MADE IT FOR YOU TO DO YOUR WORK, TAKE CARE OF THINGS AT HOME, OR GET ALONG WITH OTHER PEOPLE: NOT DIFFICULT AT ALL
5. BEING SO RESTLESS THAT IT IS HARD TO SIT STILL: NOT AT ALL
6. BECOMING EASILY ANNOYED OR IRRITABLE: SEVERAL DAYS
3. WORRYING TOO MUCH ABOUT DIFFERENT THINGS: SEVERAL DAYS
1. FEELING NERVOUS, ANXIOUS, OR ON EDGE: SEVERAL DAYS
2. NOT BEING ABLE TO STOP OR CONTROL WORRYING: SEVERAL DAYS

## 2020-03-09 ASSESSMENT — PATIENT HEALTH QUESTIONNAIRE - PHQ9
5. POOR APPETITE OR OVEREATING: SEVERAL DAYS
SUM OF ALL RESPONSES TO PHQ QUESTIONS 1-9: 7

## 2020-03-09 ASSESSMENT — MIFFLIN-ST. JEOR: SCORE: 1394.26

## 2020-03-10 LAB
ANION GAP SERPL CALCULATED.3IONS-SCNC: 8 MMOL/L (ref 3–14)
BUN SERPL-MCNC: 11 MG/DL (ref 7–30)
CALCIUM SERPL-MCNC: 8.5 MG/DL (ref 8.5–10.1)
CHLORIDE SERPL-SCNC: 110 MMOL/L (ref 94–109)
CO2 SERPL-SCNC: 22 MMOL/L (ref 20–32)
CREAT SERPL-MCNC: 0.94 MG/DL (ref 0.52–1.04)
DEPRECATED CALCIDIOL+CALCIFEROL SERPL-MC: 9 UG/L (ref 20–75)
GFR SERPL CREATININE-BSD FRML MDRD: 71 ML/MIN/{1.73_M2}
GLUCOSE SERPL-MCNC: 92 MG/DL (ref 70–99)
POTASSIUM SERPL-SCNC: 3.9 MMOL/L (ref 3.4–5.3)
SODIUM SERPL-SCNC: 140 MMOL/L (ref 133–144)

## 2020-03-10 ASSESSMENT — ANXIETY QUESTIONNAIRES: GAD7 TOTAL SCORE: 6

## 2020-07-13 ENCOUNTER — TELEPHONE (OUTPATIENT)
Dept: FAMILY MEDICINE | Facility: CLINIC | Age: 50
End: 2020-07-13

## 2020-07-13 NOTE — TELEPHONE ENCOUNTER
Summary:    Patient is due/failing the following:   COLONOSCOPY    Type of outreach:  Sent [employer] message.  Action needed:     If need for provider review:    Please indicate OV, lab, MTM, or nurse appt if needed.  Indicate fasting or not fasting.                                                                                                                                          Citlaly Trotter, CMA on 7/13/2020 at 3:52 PM

## 2020-11-07 ENCOUNTER — HEALTH MAINTENANCE LETTER (OUTPATIENT)
Age: 50
End: 2020-11-07

## 2021-05-16 ENCOUNTER — HEALTH MAINTENANCE LETTER (OUTPATIENT)
Age: 51
End: 2021-05-16

## 2021-09-05 ENCOUNTER — HEALTH MAINTENANCE LETTER (OUTPATIENT)
Age: 51
End: 2021-09-05

## 2021-12-10 NOTE — PROGRESS NOTES
"  Assessment & Plan     Fatigue, unspecified type  Discussed checking labs today   - TSH with free T4 reflex; Future  - CBC with platelets; Future  - Comprehensive metabolic panel (BMP + Alb, Alk Phos, ALT, AST, Total. Bili, TP); Future  - Hemoglobin A1c; Future  - Adult Cardiology Eval Referral; Future  - TSH with free T4 reflex  - CBC with platelets  - Comprehensive metabolic panel (BMP + Alb, Alk Phos, ALT, AST, Total. Bili, TP)  - Hemoglobin A1c  - Urine Culture Aerobic Bacterial - lab collect; Future    Heartburn  Referred for GO for the EGD , will try with pepcid first BID   - Adult Gastro Ref - Procedure Only; Future  - famotidine (PEPCID) 10 MG tablet; Take 1 tablet (10 mg) by mouth 2 times daily    RLQ abdominal pain  Will do labs as above , will do an EGD   - CBC with platelets; Future  - Comprehensive metabolic panel (BMP + Alb, Alk Phos, ALT, AST, Total. Bili, TP); Future  - UA with Microscopic reflex to Culture - lab collect; Future  - CBC with platelets  - Comprehensive metabolic panel (BMP + Alb, Alk Phos, ALT, AST, Total. Bili, TP)  - UA with Microscopic reflex to Culture - lab collect  - Urine Microscopic      Hypovitaminosis D  Hx of hypovitaminosis , will check levels today   - Vitamin D Deficiency; Future  - Vitamin D Deficiency    Palpitations  As below referred to cardiology     Chest discomfort  Will refer to cardiology for stress echo at least   - Adult Cardiology Eval Referral; Future    Screening for hypercholesterolemia  Will check lipids today as well   - Lipid panel reflex to direct LDL Fasting; Future  - Lipid panel reflex to direct LDL Fasting    Encounter for immunization  Got her booster today   - COVID-19,PF,PFIZER (12+ Yrs PURPLE LABEL)      RTC if no improving or worsening.  Pt is aware  and comfortable with the current plan.         BMI:   Estimated body mass index is 29.85 kg/m  as calculated from the following:    Height as of this encounter: 1.62 m (5' 3.78\").    Weight as of " this encounter: 78.3 kg (172 lb 11.2 oz).         Kenna Soto MD  [facility] is a 51 year old who presents for the following health issues     History of Present Illness       She eats 2-3 servings of fruits and vegetables daily.She consumes 0 sweetened beverage(s) daily.She exercises with enough effort to increase her heart rate 9 or less minutes per day.  She exercises with enough effort to increase her heart rate 3 or less days per week.   She is taking medications regularly.   less than since her LMP , some hot flashes   Fatigue all the time , more recently   lrq pain on and off , test   GERD/Heartburn- acid reflux new , stomach indigestion ,cramps in the abdomen, diarrhea   Onset/Duration: 10 DAYS GO   Description: heartburn after meals, RLQ pressure/discomfort  Intensity: moderate  Progression of Symptoms: same  Accompanying Signs & Symptoms: low energy, feels sleepy after meals/sweets  Does it feel like food gets stuck or trouble swallowing: no  Nausea: no  Vomiting (bloody?): no  Abdominal Pain: YES  Black-Tarry stools: no  Bloody stools: no  History:  Previous similar episodes: no  Previous ulcers: no  Precipitating factors:   Caffeine use: no  Alcohol use: no  NSAID/Aspirin use: no  Tobacco use: no  Worse with no particular food or drink.  Alleviating factors: None  Therapies tried and outcome:             Lifestyle changes: None            Medications: none  PGM has had MI and also she has palpitations ,   She has seen cardiologist in 2011 , 2019 , works up was normal     Review of Systems   Constitutional, HEENT, cardiovascular, pulmonary, GI, , musculoskeletal, neuro, skin, endocrine and psych systems are negative, except as otherwise noted.      Objective    LMP 12/02/2019   There is no height or weight on file to calculate BMI.  Physical Exam   GENERAL: healthy, alert and no distress  EYES: Eyes grossly normal to inspection, PERRL and conjunctivae and  sclerae normal  HENT: ear canals and TM's normal, nose and mouth without ulcers or lesions  NECK: no adenopathy, no asymmetry, masses, or scars and thyroid normal to palpation  RESP: lungs clear to auscultation - no rales, rhonchi or wheezes  CV: regular rate and rhythm, normal S1 S2, no S3 or S4, no murmur, click or rub, no peripheral edema and peripheral pulses strong  ABDOMEN: soft, nontender, no hepatosplenomegaly, no masses and bowel sounds normal  MS: no gross musculoskeletal defects noted, no edema  NEURO: Normal strength and tone, mentation intact and speech normal  PSYCH: mentation appears normal, affect normal/bright  LYMPH: no cervical, supraclavicular, axillary, or inguinal adenopathy    Results for orders placed or performed in visit on 12/14/21   Vitamin D Deficiency     Status: Normal   Result Value Ref Range    Vitamin D, Total (25-Hydroxy) 25 20 - 75 ug/L    Narrative    Season, race, dietary intake, and treatment affect the concentration of 25-hydroxy-Vitamin D. Values may decrease during winter months and increase during summer months. Values 20-29 ug/L may indicate Vitamin D insufficiency and values <20 ug/L may indicate Vitamin D deficiency.    Vitamin D determination is routinely performed by an immunoassay specific for 25 hydroxyvitamin D3.  If an individual is on vitamin D2(ergocalciferol) supplementation, please specify 25 OH vitamin D2 and D3 level determination by LCMSMS test VITD23.     TSH with free T4 reflex     Status: Normal   Result Value Ref Range    TSH 2.46 0.40 - 4.00 mU/L   CBC with platelets     Status: Normal   Result Value Ref Range    WBC Count 5.5 4.0 - 11.0 10e3/uL    RBC Count 4.68 3.80 - 5.20 10e6/uL    Hemoglobin 14.0 11.7 - 15.7 g/dL    Hematocrit 43.4 35.0 - 47.0 %    MCV 93 78 - 100 fL    MCH 29.9 26.5 - 33.0 pg    MCHC 32.3 31.5 - 36.5 g/dL    RDW 12.7 10.0 - 15.0 %    Platelet Count 268 150 - 450 10e3/uL   Comprehensive metabolic panel (BMP + Alb, Alk Phos, ALT,  AST, Total. Bili, TP)     Status: Normal   Result Value Ref Range    Sodium 139 133 - 144 mmol/L    Potassium 4.1 3.4 - 5.3 mmol/L    Chloride 108 94 - 109 mmol/L    Carbon Dioxide (CO2) 25 20 - 32 mmol/L    Anion Gap 6 3 - 14 mmol/L    Urea Nitrogen 10 7 - 30 mg/dL    Creatinine 0.93 0.52 - 1.04 mg/dL    Calcium 8.9 8.5 - 10.1 mg/dL    Glucose 87 70 - 99 mg/dL    Alkaline Phosphatase 79 40 - 150 U/L    AST 15 0 - 45 U/L    ALT 47 0 - 50 U/L    Protein Total 7.6 6.8 - 8.8 g/dL    Albumin 3.6 3.4 - 5.0 g/dL    Bilirubin Total 0.7 0.2 - 1.3 mg/dL    GFR Estimate 71 >60 mL/min/1.73m2   Hemoglobin A1c     Status: Normal   Result Value Ref Range    Hemoglobin A1C 5.2 0.0 - 5.6 %   UA with Microscopic reflex to Culture - lab collect     Status: Abnormal    Specimen: Urine, Midstream   Result Value Ref Range    Color Urine Yellow Colorless, Straw, Light Yellow, Yellow    Appearance Urine Clear Clear    Glucose Urine Negative Negative mg/dL    Bilirubin Urine Negative Negative    Ketones Urine Negative Negative mg/dL    Specific Gravity Urine >=1.030 1.003 - 1.035    Blood Urine Trace (A) Negative    pH Urine 5.5 5.0 - 7.0    Protein Albumin Urine Negative Negative mg/dL    Urobilinogen Urine 0.2 0.2, 1.0 E.U./dL    Nitrite Urine Negative Negative    Leukocyte Esterase Urine Small (A) Negative   Lipid panel reflex to direct LDL Fasting     Status: None   Result Value Ref Range    Cholesterol 180 <200 mg/dL    Triglycerides 136 <150 mg/dL    Direct Measure HDL 57 >=50 mg/dL    LDL Cholesterol Calculated 96 <=100 mg/dL    Non HDL Cholesterol 123 <130 mg/dL    Patient Fasting > 8hrs? Yes     Narrative    Cholesterol  Desirable:  <200 mg/dL    Triglycerides  Normal:  Less than 150 mg/dL  Borderline High:  150-199 mg/dL  High:  200-499 mg/dL  Very High:  Greater than or equal to 500 mg/dL    Direct Measure HDL  Female:  Greater than or equal to 50 mg/dL   Male:  Greater than or equal to 40 mg/dL    LDL Cholesterol  Desirable:   <100mg/dL  Above Desirable:  100-129 mg/dL   Borderline High:  130-159 mg/dL   High:  160-189 mg/dL   Very High:  >= 190 mg/dL    Non HDL Cholesterol  Desirable:  130 mg/dL  Above Desirable:  130-159 mg/dL  Borderline High:  160-189 mg/dL  High:  190-219 mg/dL  Very High:  Greater than or equal to 220 mg/dL   Urine Microscopic     Status: Abnormal   Result Value Ref Range    RBC Urine 2-5 (A) 0-2 /HPF /HPF    WBC Urine 5-10 (A) 0-5 /HPF /HPF    Squamous Epithelials Urine Few (A) None Seen /LPF    Mucus Urine Present (A) None Seen /LPF    Narrative    Urine Culture not indicated

## 2021-12-14 ENCOUNTER — OFFICE VISIT (OUTPATIENT)
Dept: FAMILY MEDICINE | Facility: CLINIC | Age: 51
End: 2021-12-14
Payer: COMMERCIAL

## 2021-12-14 VITALS
SYSTOLIC BLOOD PRESSURE: 109 MMHG | TEMPERATURE: 97.3 F | OXYGEN SATURATION: 97 % | WEIGHT: 172.7 LBS | HEART RATE: 68 BPM | DIASTOLIC BLOOD PRESSURE: 76 MMHG | BODY MASS INDEX: 29.49 KG/M2 | HEIGHT: 64 IN

## 2021-12-14 DIAGNOSIS — E55.9 HYPOVITAMINOSIS D: ICD-10-CM

## 2021-12-14 DIAGNOSIS — R00.2 PALPITATIONS: ICD-10-CM

## 2021-12-14 DIAGNOSIS — R53.83 FATIGUE, UNSPECIFIED TYPE: Primary | ICD-10-CM

## 2021-12-14 DIAGNOSIS — R07.89 CHEST DISCOMFORT: ICD-10-CM

## 2021-12-14 DIAGNOSIS — R10.31 RLQ ABDOMINAL PAIN: ICD-10-CM

## 2021-12-14 DIAGNOSIS — Z23 ENCOUNTER FOR IMMUNIZATION: ICD-10-CM

## 2021-12-14 DIAGNOSIS — Z13.220 SCREENING FOR HYPERCHOLESTEROLEMIA: ICD-10-CM

## 2021-12-14 DIAGNOSIS — R12 HEARTBURN: ICD-10-CM

## 2021-12-14 LAB
ALBUMIN UR-MCNC: NEGATIVE MG/DL
APPEARANCE UR: CLEAR
BILIRUB UR QL STRIP: NEGATIVE
COLOR UR AUTO: YELLOW
ERYTHROCYTE [DISTWIDTH] IN BLOOD BY AUTOMATED COUNT: 12.7 % (ref 10–15)
GLUCOSE UR STRIP-MCNC: NEGATIVE MG/DL
HBA1C MFR BLD: 5.2 % (ref 0–5.6)
HCT VFR BLD AUTO: 43.4 % (ref 35–47)
HGB BLD-MCNC: 14 G/DL (ref 11.7–15.7)
HGB UR QL STRIP: ABNORMAL
KETONES UR STRIP-MCNC: NEGATIVE MG/DL
LEUKOCYTE ESTERASE UR QL STRIP: ABNORMAL
MCH RBC QN AUTO: 29.9 PG (ref 26.5–33)
MCHC RBC AUTO-ENTMCNC: 32.3 G/DL (ref 31.5–36.5)
MCV RBC AUTO: 93 FL (ref 78–100)
MUCOUS THREADS #/AREA URNS LPF: PRESENT /LPF
NITRATE UR QL: NEGATIVE
PH UR STRIP: 5.5 [PH] (ref 5–7)
PLATELET # BLD AUTO: 268 10E3/UL (ref 150–450)
RBC # BLD AUTO: 4.68 10E6/UL (ref 3.8–5.2)
RBC #/AREA URNS AUTO: ABNORMAL /HPF
SP GR UR STRIP: >=1.03 (ref 1–1.03)
SQUAMOUS #/AREA URNS AUTO: ABNORMAL /LPF
UROBILINOGEN UR STRIP-ACNC: 0.2 E.U./DL
WBC # BLD AUTO: 5.5 10E3/UL (ref 4–11)
WBC #/AREA URNS AUTO: ABNORMAL /HPF

## 2021-12-14 PROCEDURE — 80061 LIPID PANEL: CPT | Performed by: FAMILY MEDICINE

## 2021-12-14 PROCEDURE — 83036 HEMOGLOBIN GLYCOSYLATED A1C: CPT | Performed by: FAMILY MEDICINE

## 2021-12-14 PROCEDURE — 81001 URINALYSIS AUTO W/SCOPE: CPT | Performed by: FAMILY MEDICINE

## 2021-12-14 PROCEDURE — 0004A COVID-19,PF,PFIZER (12+ YRS): CPT | Performed by: FAMILY MEDICINE

## 2021-12-14 PROCEDURE — 80053 COMPREHEN METABOLIC PANEL: CPT | Performed by: FAMILY MEDICINE

## 2021-12-14 PROCEDURE — 36415 COLL VENOUS BLD VENIPUNCTURE: CPT | Performed by: FAMILY MEDICINE

## 2021-12-14 PROCEDURE — 84443 ASSAY THYROID STIM HORMONE: CPT | Performed by: FAMILY MEDICINE

## 2021-12-14 PROCEDURE — 99204 OFFICE O/P NEW MOD 45 MIN: CPT | Performed by: FAMILY MEDICINE

## 2021-12-14 PROCEDURE — 91300 COVID-19,PF,PFIZER (12+ YRS): CPT | Performed by: FAMILY MEDICINE

## 2021-12-14 PROCEDURE — 85027 COMPLETE CBC AUTOMATED: CPT | Performed by: FAMILY MEDICINE

## 2021-12-14 PROCEDURE — 82306 VITAMIN D 25 HYDROXY: CPT | Performed by: FAMILY MEDICINE

## 2021-12-14 RX ORDER — FAMOTIDINE 10 MG
10 TABLET ORAL 2 TIMES DAILY
Qty: 60 TABLET | Refills: 0 | Status: SHIPPED | OUTPATIENT
Start: 2021-12-14 | End: 2022-02-01

## 2021-12-14 ASSESSMENT — MIFFLIN-ST. JEOR: SCORE: 1374.87

## 2021-12-14 NOTE — PATIENT INSTRUCTIONS
PLEASE CALL TO SCHEDULE YOUR MAMMOGRAM  [facility] [phone]  [facility] [phone]  [facility]   [phone]  Central Scheduling (all locations) [phone]    If you are under/uninsured, we recommend you contact the [facility]. They offer mammograms on a sliding fee charge. You can schedule with them at [phone].

## 2021-12-15 LAB
ALBUMIN SERPL-MCNC: 3.6 G/DL (ref 3.4–5)
ALP SERPL-CCNC: 79 U/L (ref 40–150)
ALT SERPL W P-5'-P-CCNC: 47 U/L (ref 0–50)
ANION GAP SERPL CALCULATED.3IONS-SCNC: 6 MMOL/L (ref 3–14)
AST SERPL W P-5'-P-CCNC: 15 U/L (ref 0–45)
BILIRUB SERPL-MCNC: 0.7 MG/DL (ref 0.2–1.3)
BUN SERPL-MCNC: 10 MG/DL (ref 7–30)
CALCIUM SERPL-MCNC: 8.9 MG/DL (ref 8.5–10.1)
CHLORIDE BLD-SCNC: 108 MMOL/L (ref 94–109)
CO2 SERPL-SCNC: 25 MMOL/L (ref 20–32)
CREAT SERPL-MCNC: 0.93 MG/DL (ref 0.52–1.04)
DEPRECATED CALCIDIOL+CALCIFEROL SERPL-MC: 25 UG/L (ref 20–75)
GFR SERPL CREATININE-BSD FRML MDRD: 71 ML/MIN/1.73M2
GLUCOSE BLD-MCNC: 87 MG/DL (ref 70–99)
POTASSIUM BLD-SCNC: 4.1 MMOL/L (ref 3.4–5.3)
PROT SERPL-MCNC: 7.6 G/DL (ref 6.8–8.8)
SODIUM SERPL-SCNC: 139 MMOL/L (ref 133–144)
TSH SERPL DL<=0.005 MIU/L-ACNC: 2.46 MU/L (ref 0.4–4)

## 2021-12-17 LAB
CHOLEST SERPL-MCNC: 180 MG/DL
FASTING STATUS PATIENT QL REPORTED: YES
HDLC SERPL-MCNC: 57 MG/DL
LDLC SERPL CALC-MCNC: 96 MG/DL
NONHDLC SERPL-MCNC: 123 MG/DL
TRIGL SERPL-MCNC: 136 MG/DL

## 2022-01-31 NOTE — PROGRESS NOTES
"CARDIOLOGY CONSULT    REASON FOR CONSULT: chest pain    PRIMARY CARE PHYSICIAN:  Kenna Soto    HISTORY OF PRESENT ILLNESS:  Ms. Oro is a 50 y/o woman without significant PMH who presents for the evaluation of chest pain.  In review, she was evaluated by cardiology in 2019 through [facility].  At that time, it was felt she was experiencing noncardiac chest pain.  It was felt to be musculoskeletal in origin.   A ZIO patch monitor was performed which demonstrated rare premature beats, no significant arrhythmias. Since the chest pain was not felt to be cardiac in origin a stress test was not pursued and reassurance was provided.  Today, David notes heart burn symptoms that started in November around the time she went on a vacation to [place].  She states she notices to more often when she has been active.  Symptoms will start 20 minutes to several hours after activity.  She describes it as a burning discomfort that starts in her abdomen and radiates up to her neck and throat.  She will notice a bad taste in her throat.  She was prescribed pepcid, however, David states she has not taken the medication as she would prefer to make dietary and lifestyle changes to treat her symptoms.  She states she has a friend who had similar symptoms and was ultimately diagnosed with heart disease and so she is concerned regarding the status of her heart.    She also notes a sense of heart pounding or heart \"swinging\" in her chest that is fleeting in nature and generally occurs at night when she lays down to sleep. She has a fitbit watch with ECG and she states the heart rate has been in the normal range during these episodes and either states normal sinus rhythm or indeterminate.   These are not available for my review.              PAST MEDICAL HISTORY:  1.  Dysfunctional uterine bleeding  2.  Leiomyoma of uterus   3.  Heart burn    MEDICATIONS:  Current Outpatient Medications   Medication     famotidine (PEPCID) 10 MG " tablet     No current facility-administered medications for this visit.       ALLERGIES:  No Known Allergies    SOCIAL HISTORY:  Quit smoking 15 years ago (15 year history).  No significant ETOH.      I  FAMILY HISTORY:  No family hx of early CAD      REVIEW OF SYSTEMS:  A complete ROS was obtained and the pertinent positives are outlined in the history of present illness above.  The remainder of systems is negative.      PHYSICAL EXAM:                     Vital Signs with Ranges     0 lbs 0 oz    Constitutional: awake, alert, no distress  Eyes: PERRL, sclera nonicteric  ENT: trachea midline  Respiratory: CTAB  Cardiovascular:   RRR no m/r/g, no JVD  GI: nondistended, nontender, bowel sounds present  Lymph/Hematologic: no lymphadenopathy  Skin: dry, no rash  Musculoskeletal: good muscle tone, no edema bilaterally  Neurologic: no focal deficits  Neuropsychiatric: appropriate affact    DATA:  Labs:   Dated 12/14/21 reviewed personally  LDL 96  HDL 57  Tri 136    EKG: Dated 2/1/22 reviewed personally; normal sinus rhythm without ST segment changes          ASSESSMENT:  1.  Chest pain:  Symptoms most suggestive of GERD.  She notes, however, symptoms are worse with activity.  She has limited risk factors for CAD.    2.  Palpitations:  Symptoms suggestive of premature beats. Prior zio patch monitor in 2019 demonstrated rare ectopy without significant arrhythmias.   Symptoms are infrequent.        RECOMMENDATIONS:  1.  Exercise GXT for further risk stratification  2.  Discussed further monitoring for palpitations.  She has a fitbit with excellent ECG monitoring.  She will look into the fitbit nunu so she can download any future ECGs for me to review.          Gaby Ambrocio MD [facility]  February 1, 2022

## 2022-02-01 ENCOUNTER — OFFICE VISIT (OUTPATIENT)
Dept: CARDIOLOGY | Facility: CLINIC | Age: 52
End: 2022-02-01
Attending: FAMILY MEDICINE
Payer: COMMERCIAL

## 2022-02-01 VITALS
SYSTOLIC BLOOD PRESSURE: 128 MMHG | OXYGEN SATURATION: 96 % | DIASTOLIC BLOOD PRESSURE: 76 MMHG | HEIGHT: 64 IN | BODY MASS INDEX: 30.61 KG/M2 | WEIGHT: 179.3 LBS | HEART RATE: 76 BPM

## 2022-02-01 DIAGNOSIS — R53.83 FATIGUE, UNSPECIFIED TYPE: ICD-10-CM

## 2022-02-01 DIAGNOSIS — R07.89 CHEST DISCOMFORT: ICD-10-CM

## 2022-02-01 PROCEDURE — 99203 OFFICE O/P NEW LOW 30 MIN: CPT | Performed by: INTERNAL MEDICINE

## 2022-02-01 PROCEDURE — 93000 ELECTROCARDIOGRAM COMPLETE: CPT | Performed by: INTERNAL MEDICINE

## 2022-02-01 ASSESSMENT — MIFFLIN-ST. JEOR: SCORE: 1404.81

## 2022-02-01 NOTE — LETTER
"2/1/2022    Kenna Soto MD  [address]  [place] [address]    RE: David Oro       Dear Colleague,     I had the pleasure of seeing David Oro in the [facility].  CARDIOLOGY CONSULT    REASON FOR CONSULT: chest pain    PRIMARY CARE PHYSICIAN:  Kenna Soto    HISTORY OF PRESENT ILLNESS:  Ms. Oro is a 52 y/o woman without significant PMH who presents for the evaluation of chest pain.  In review, she was evaluated by cardiology in 2019 through [facility].  At that time, it was felt she was experiencing noncardiac chest pain.  It was felt to be musculoskeletal in origin.   A ZIO patch monitor was performed which demonstrated rare premature beats, no significant arrhythmias. Since the chest pain was not felt to be cardiac in origin a stress test was not pursued and reassurance was provided.  Today, David notes heart burn symptoms that started in November around the time she went on a vacation to [place].  She states she notices to more often when she has been active.  Symptoms will start 20 minutes to several hours after activity.  She describes it as a burning discomfort that starts in her abdomen and radiates up to her neck and throat.  She will notice a bad taste in her throat.  She was prescribed pepcid, however, David states she has not taken the medication as she would prefer to make dietary and lifestyle changes to treat her symptoms.  She states she has a friend who had similar symptoms and was ultimately diagnosed with heart disease and so she is concerned regarding the status of her heart.    She also notes a sense of heart pounding or heart \"swinging\" in her chest that is fleeting in nature and generally occurs at night when she lays down to sleep. She has a fitbit watch with ECG and she states the heart rate has been in the normal range during these episodes and either states normal sinus rhythm or indeterminate.   These are " not available for my review.              PAST MEDICAL HISTORY:  1.  Dysfunctional uterine bleeding  2.  Leiomyoma of uterus   3.  Heart burn    MEDICATIONS:  Current Outpatient Medications   Medication     famotidine (PEPCID) 10 MG tablet     No current facility-administered medications for this visit.       ALLERGIES:  No Known Allergies    SOCIAL HISTORY:  Quit smoking 15 years ago (15 year history).  No significant ETOH.      I  FAMILY HISTORY:  No family hx of early CAD      REVIEW OF SYSTEMS:  A complete ROS was obtained and the pertinent positives are outlined in the history of present illness above.  The remainder of systems is negative.      PHYSICAL EXAM:                     Vital Signs with Ranges     0 lbs 0 oz    Constitutional: awake, alert, no distress  Eyes: PERRL, sclera nonicteric  ENT: trachea midline  Respiratory: CTAB  Cardiovascular:   RRR no m/r/g, no JVD  GI: nondistended, nontender, bowel sounds present  Lymph/Hematologic: no lymphadenopathy  Skin: dry, no rash  Musculoskeletal: good muscle tone, no edema bilaterally  Neurologic: no focal deficits  Neuropsychiatric: appropriate affact    DATA:  Labs:   Dated 12/14/21 reviewed personally  LDL 96  HDL 57  Tri 136    EKG: Dated 2/1/22 reviewed personally; normal sinus rhythm without ST segment changes          ASSESSMENT:  1.  Chest pain:  Symptoms most suggestive of GERD.  She notes, however, symptoms are worse with activity.  She has limited risk factors for CAD.    2.  Palpitations:  Symptoms suggestive of premature beats. Prior zio patch monitor in 2019 demonstrated rare ectopy without significant arrhythmias.   Symptoms are infrequent.        RECOMMENDATIONS:  1.  Exercise GXT for further risk stratification  2.  Discussed further monitoring for palpitations.  She has a fitbit with excellent ECG monitoring.  She will look into the fitbit nunu so she can download any future ECGs for me to review.          Gaby Ambrocio MD [facility]  [facility]  February 1, 2022      Thank you for allowing me to participate in the care of your patient.      Sincerely,     Gaby Ambrocio MD     [facility]  cc:   Kenna Soto MD  [address] [address]

## 2022-02-01 NOTE — PATIENT INSTRUCTIONS
-Schedule exercise stress test  -Look into [employer] nunu that records the ECG to phone  -Increase activity level; do some exercise each day.  All activity adds up throughout the day.  -Dr. Ambrocio will review results of stress test and provide any additional recommendations

## 2022-02-02 ENCOUNTER — HOSPITAL ENCOUNTER (OUTPATIENT)
Dept: CARDIOLOGY | Facility: CLINIC | Age: 52
Discharge: HOME | End: 2022-02-02
Attending: INTERNAL MEDICINE | Admitting: INTERNAL MEDICINE
Payer: COMMERCIAL

## 2022-02-02 DIAGNOSIS — R07.89 CHEST DISCOMFORT: ICD-10-CM

## 2022-02-02 PROCEDURE — 93016 CV STRESS TEST SUPVJ ONLY: CPT | Performed by: INTERNAL MEDICINE

## 2022-02-02 PROCEDURE — 93017 CV STRESS TEST TRACING ONLY: CPT

## 2022-02-02 PROCEDURE — 93018 CV STRESS TEST I&R ONLY: CPT | Performed by: INTERNAL MEDICINE

## 2022-02-10 ENCOUNTER — MYC MEDICAL ADVICE (OUTPATIENT)
Dept: CARDIOLOGY | Facility: CLINIC | Age: 52
End: 2022-02-10
Payer: COMMERCIAL

## 2022-02-10 NOTE — TELEPHONE ENCOUNTER
[employer] message received. RN will send to Dr. Ambrocio for review.       [facility] Doctor Cristóbal,     I found the files from my [employer] ECG nunu when I recorded the heart unsettlement, They are old - from December. I'll try to record and send more recent ones when I start experiencing the funny heart flutters.     Thank you for your time,   David       2/1/22 visit

## 2022-06-12 ENCOUNTER — HEALTH MAINTENANCE LETTER (OUTPATIENT)
Age: 52
End: 2022-06-12

## 2022-10-07 ENCOUNTER — HOSPITAL ENCOUNTER (EMERGENCY)
Facility: CLINIC | Age: 52
Discharge: HOME | End: 2022-10-07
Attending: EMERGENCY MEDICINE | Admitting: EMERGENCY MEDICINE
Payer: COMMERCIAL

## 2022-10-07 VITALS
BODY MASS INDEX: 38.18 KG/M2 | SYSTOLIC BLOOD PRESSURE: 132 MMHG | OXYGEN SATURATION: 98 % | HEIGHT: 55 IN | DIASTOLIC BLOOD PRESSURE: 85 MMHG | WEIGHT: 165 LBS | TEMPERATURE: 98.1 F | RESPIRATION RATE: 18 BRPM | HEART RATE: 90 BPM

## 2022-10-07 DIAGNOSIS — T78.3XXA ANGIOEDEMA, INITIAL ENCOUNTER: ICD-10-CM

## 2022-10-07 PROCEDURE — 250N000011 HC RX IP 250 OP 636: Performed by: EMERGENCY MEDICINE

## 2022-10-07 PROCEDURE — 96374 THER/PROPH/DIAG INJ IV PUSH: CPT

## 2022-10-07 PROCEDURE — 99284 EMERGENCY DEPT VISIT MOD MDM: CPT | Mod: 25

## 2022-10-07 PROCEDURE — 250N000013 HC RX MED GY IP 250 OP 250 PS 637: Performed by: EMERGENCY MEDICINE

## 2022-10-07 RX ORDER — FAMOTIDINE 20 MG/1
20 TABLET, FILM COATED ORAL ONCE
Status: COMPLETED | OUTPATIENT
Start: 2022-10-07 | End: 2022-10-07

## 2022-10-07 RX ORDER — EPINEPHRINE 0.3 MG/.3ML
0.3 INJECTION SUBCUTANEOUS
Qty: 2 EACH | Refills: 0 | Status: SHIPPED | OUTPATIENT
Start: 2022-10-07

## 2022-10-07 RX ORDER — DEXAMETHASONE SODIUM PHOSPHATE 4 MG/ML
10 INJECTION, SOLUTION INTRA-ARTICULAR; INTRALESIONAL; INTRAMUSCULAR; INTRAVENOUS; SOFT TISSUE ONCE
Status: COMPLETED | OUTPATIENT
Start: 2022-10-07 | End: 2022-10-07

## 2022-10-07 RX ORDER — PREDNISONE 20 MG/1
TABLET ORAL
Qty: 10 TABLET | Refills: 0 | Status: SHIPPED | OUTPATIENT
Start: 2022-10-07 | End: 2023-09-06

## 2022-10-07 RX ORDER — FAMOTIDINE 20 MG/1
20 TABLET, FILM COATED ORAL 2 TIMES DAILY
Qty: 28 TABLET | Refills: 0 | Status: SHIPPED | OUTPATIENT
Start: 2022-10-07 | End: 2022-10-21

## 2022-10-07 RX ADMIN — FAMOTIDINE 20 MG: 20 TABLET ORAL at 07:48

## 2022-10-07 RX ADMIN — DEXAMETHASONE SODIUM PHOSPHATE 10 MG: 4 INJECTION, SOLUTION INTRAMUSCULAR; INTRAVENOUS at 07:47

## 2022-10-07 NOTE — ED PROVIDER NOTES
History     Chief Complaint:  Oral Swelling       HPI   David Oro is a 52 year old female who presents with lip swelling and throat swelling.  She states that Wednesday night her upper lip was slightly swollen which then went away relatively quickly.  Last night she states that her lower lip started swelling in the right side, which then worsened to the entire lower lip.  She also states that her tongue felt swollen and tingly earlier, but that has since improved.  She additionally has some feeling of her throat being swollen.  She denies any new foods, clothing, detergents, soaps, make-up, or anything else that she can think of.  She has not had any other symptoms and she is not currently on any medications.  She has never had anything like this before.    ROS:  Review of Systems   All other systems reviewed and are negative.        Allergies:  No Known Allergies     Medications:    No current outpatient medications on file.      Past Medical History:    Past Medical History:   Diagnosis Date     Chronic idiopathic constipation 12/2/2016     DUB (dysfunctional uterine bleeding) 2010     Hot flashes 12/2/2016     Leiomyoma of uterus 2/26/09     RhD negative      Tremor of both hands 12/2/2016     Uterine polyp 8/2/04       Past Surgical History:    Past Surgical History:   Procedure Laterality Date     dilation and curretage  8/2/04    UTERINE POLYP REMOVED; PATH: BENIGN POLYP,PROLIFERATIVE ENDOMETRIUM     ENDOMETRIAL SAMPLING (BIOPSY)  9/21/10    SECRETORY ENDOMETRIUM     GYN SURGERY  2006    Removing Fibroid     HYSTEROSCOPY  8/2/04    UTERINE POLYP REMOVED     ORTHOPEDIC SURGERY  1975    Broken left arm        Family History:    family history includes Coronary Artery Disease in her paternal grandmother; Diabetes in her paternal grandmother; Hypertension in her father, maternal grandfather, mother, paternal grandfather, and paternal grandmother; Osteoporosis in her mother; Other Cancer in her  "father, maternal grandfather, and maternal grandmother; Uterine Cancer in her maternal grandmother.    Social History:   reports that she quit smoking about 22 years ago. Her smoking use included cigarettes. She started smoking about 37 years ago. She has a 15.00 pack-year smoking history. She has never used smokeless tobacco. She reports current alcohol use. She reports that she does not use drugs.  PCP: Kenna Soto     Physical Exam     Patient Vitals for the past 24 hrs:   BP Temp Temp src Pulse Resp SpO2 Height Weight   10/07/22 0708 132/85 98.1  F (36.7  C) Oral 90 18 98 % 0.981 m (3' 2.62\") 74.8 kg (165 lb)        Physical Exam  Nursing note and vitals reviewed.  Constitutional:  Oriented to person, place, and time. Cooperative.   HENT:   Nose:    Nose normal.   Mouth/Throat:   Uvula is slightly edematous but no other intraoral edema.  Tongue is normal appearing.  Some mild edema to the entire lower lip.  Upper lip is normal.   Eyes:    Conjunctivae normal and EOM are normal.      Pupils are equal, round, and reactive to light.   Neck:    Trachea normal.   Cardiovascular:  Normal rate, regular rhythm, normal heart sounds and normal pulses. No murmur heard.  Pulmonary/Chest:  Effort normal and breath sounds normal.   Abdominal:   Soft. Normal appearance and bowel sounds are normal.      There is no tenderness.      There is no rebound and no CVA tenderness.   Musculoskeletal:  Extremities atraumatic x 4.   Lymphadenopathy:  No cervical adenopathy.   Neurological:   Alert and oriented to person, place, and time. Normal strength.      No cranial nerve deficit or sensory deficit. GCS eye subscore is 4. GCS verbal subscore is 5. GCS motor subscore is 6.   Skin:    Skin is intact. No rash noted.   Psychiatric:   Normal mood and affect.      Emergency Department Course   Emergency Department Course:             Reviewed:  I reviewed nursing notes, vitals, past medical history, Care Everywhere and " [facility]    Interventions:  Medications   dexamethasone (DECADRON) injection 10 mg (10 mg Intravenous Given 10/7/22 0747)   famotidine (PEPCID) tablet 20 mg (20 mg Oral Given 10/7/22 0748)        Disposition:  The patient was discharged to home.     Impression & Plan    Medical Decision Making:  This is a 52-year-old female who came in for lip and throat swelling.  She does have some mild edema to the lower lip and to her uvula, but no other areas that appear to be affected currently.  She is not on lisinopril, and therefore that clearly is not the cause of this.  She appears to be maintaining her airway and protecting her airway without any compromise to her throat.  Therefore I do not feel that she requires an EpiPen currently.  She also does feel like she has improved this morning from what it was like during the night.  She drove herself here, and therefore she cannot be provided Benadryl.  However she was provided oral Pepcid and dexamethasone.  I will send her home with prescriptions for 5 more days of prednisone as well as Pepcid and an EpiPen.  She knows that if things worsen acutely and severely, she should use the EpiPen and then seek immediate evaluation either calling 911 or coming to the ER directly.  I recommended close outpatient follow-up with her primary care provider, and I am providing her the contact information for an allergy clinic as well.  She knows to take Benadryl at home and that she should not drive after taking that as well.    Diagnosis:    ICD-10-CM    1. Angioedema, initial encounter  T78.3XXA         Discharge Medications:  New Prescriptions    EPINEPHRINE (ANY BX GENERIC EQUIV) 0.3 MG/0.3ML INJECTION 2-PACK    Inject 0.3 mLs (0.3 mg) into the muscle once as needed for anaphylaxis May repeat one time in 5-15 minutes if response to initial dose is inadequate.    FAMOTIDINE (PEPCID) 20 MG TABLET    Take 1 tablet (20 mg) by mouth 2 times daily for 14 days    PREDNISONE (DELTASONE) 20 MG  TABLET    Take two tablets (= 40mg) each day for 5 (five) days        10/7/2022   Chacho Castillo MD Lashkowitz, Seth H, MD  10/07/22 0751

## 2022-10-07 NOTE — ED TRIAGE NOTES
Pt. States on Wed night her upper lip was swollen, and it went away. Last night she states her bottom lip started swelling. Today feels tongue is tingling. Pt. Denies allergies or new medications. History of cold sores.      Triage Assessment     Row Name 10/07/22 0712       Triage Assessment (Adult)    Airway WDL WDL       Respiratory WDL    Respiratory WDL WDL       Skin Circulation/Temperature WDL    Skin Circulation/Temperature WDL X  lower lip edema, states she can feel tongue tingling       Cardiac WDL    Cardiac WDL WDL       Peripheral/Neurovascular WDL    Peripheral Neurovascular WDL WDL       Cognitive/Neuro/Behavioral WDL    Cognitive/Neuro/Behavioral WDL WDL

## 2022-10-23 ENCOUNTER — HEALTH MAINTENANCE LETTER (OUTPATIENT)
Age: 52
End: 2022-10-23

## 2022-11-01 ENCOUNTER — OFFICE VISIT (OUTPATIENT)
Dept: FAMILY MEDICINE | Facility: CLINIC | Age: 52
End: 2022-11-01
Payer: COMMERCIAL

## 2022-11-01 VITALS
DIASTOLIC BLOOD PRESSURE: 85 MMHG | HEART RATE: 65 BPM | SYSTOLIC BLOOD PRESSURE: 124 MMHG | RESPIRATION RATE: 16 BRPM | HEIGHT: 64 IN | OXYGEN SATURATION: 97 % | BODY MASS INDEX: 28.17 KG/M2 | WEIGHT: 165 LBS | TEMPERATURE: 98.2 F

## 2022-11-01 DIAGNOSIS — D17.71 ANGIOMYOLIPOMA OF KIDNEY: ICD-10-CM

## 2022-11-01 DIAGNOSIS — T78.3XXD ANGIOEDEMA, SUBSEQUENT ENCOUNTER: ICD-10-CM

## 2022-11-01 DIAGNOSIS — R10.84 ABDOMINAL PAIN, GENERALIZED: Primary | ICD-10-CM

## 2022-11-01 PROCEDURE — 80053 COMPREHEN METABOLIC PANEL: CPT | Performed by: FAMILY MEDICINE

## 2022-11-01 PROCEDURE — 84443 ASSAY THYROID STIM HORMONE: CPT | Performed by: FAMILY MEDICINE

## 2022-11-01 PROCEDURE — 36415 COLL VENOUS BLD VENIPUNCTURE: CPT | Performed by: FAMILY MEDICINE

## 2022-11-01 PROCEDURE — 99214 OFFICE O/P EST MOD 30 MIN: CPT | Performed by: FAMILY MEDICINE

## 2022-11-01 PROCEDURE — 80061 LIPID PANEL: CPT | Performed by: FAMILY MEDICINE

## 2022-11-01 RX ORDER — DIPHENHYDRAMINE HCL 25 MG
25 TABLET ORAL EVERY 6 HOURS PRN
COMMUNITY
End: 2023-09-06

## 2022-11-01 ASSESSMENT — PAIN SCALES - GENERAL: PAINLEVEL: NO PAIN (0)

## 2022-11-01 NOTE — PROGRESS NOTES
"  Assessment & Plan     Abdominal pain, generalized  Discussed checkin g labs like LFTS , lipids  tsh today   - US Abdomen Limited; Future  - Comprehensive metabolic panel (BMP + Alb, Alk Phos, ALT, AST, Total. Bili, TP); Future  - Lipid panel reflex to direct LDL Fasting; Future  - TSH with free T4 reflex; Future  - Comprehensive metabolic panel (BMP + Alb, Alk Phos, ALT, AST, Total. Bili, TP)  - Lipid panel reflex to direct LDL Fasting  - TSH with free T4 reflex    Angioedema, subsequent encounter  Unknown cause of the angioedema as she did not have any new foods, detergents , no medications that have been recently started either , we discussed possibility for a viral illness like covid to trigger this but I have referred her to allergy for tetsting   - Adult Allergy/Asthma Referral; Future  She is better now , has an epipen , also doen with prednisone and benadryl now   Angiomyolipoma of kidney  Found on the US abdomen and recommended to do a CT scan to further assess this , I have placed the order for this   - CT Urogram wo & w Contrast; Future           MED REC REQUIRED  Post Medication Reconciliation Status: medications reconciled   BMI:   Estimated body mass index is 28.52 kg/m  as calculated from the following:    Height as of this encounter: 1.62 m (5' 3.78\").    Weight as of this encounter: 74.8 kg (165 lb).         Kenna Soto MD  [facility]   David is a 52 year old, presenting for the following health issues:  Hospital F/U      HPI     In the evening upper lip small edema but not to bad on a Wednesday , nothing on Thursday in the eveinng edema lower lip   Early Friday morning significant edema in the lower lip , tongue swollen, uvula swollen went to the ER   Gave her pepcid AC, prednisone , Epipen   bendaryl was given   While on prednisone all got better the edema , for 5 days , when October 12th she does done with prednisone ,   The weekend after again edema , " started benadryl  , 2 tablets got her sleepy but helped with the lip edema , she has stopped Pepcid   Last week stomach aches bloating , around the chest abdominal pain   After a meal , potatoes are ok for the stomach   covid last week ,  With red papules , rash like hives   Last Sunday covid test was negative   Hospital Follow-up Visit:    Hospital/Nursing Home/IP Rehab Facility: [facility]  Date of Admission: 10/07  Date of Discharge: 10/07  Reason(s) for Admission: Angioedema     Was your hospitalization related to COVID-19? No   Problems taking medications regularly:  None  Medication changes since discharge: None  Problems adhering to non-medication therapy:  None    Summary of hospitalization:  [facility] discharge summary reviewed  Diagnostic Tests/Treatments reviewed.  Follow up needed: none  Other Healthcare Providers Involved in Patient s Care:         Imaging also allergist referrl   Update since discharge: improved.         Plan of care communicated with patient                 Review of Systems   Constitutional, HEENT, cardiovascular, pulmonary, GI, , musculoskeletal, neuro, skin, endocrine and psych systems are negative, except as otherwise noted.      Objective    LMP 12/02/2019   There is no height or weight on file to calculate BMI.  Physical Exam   GENERAL: healthy, alert and no distress  EYES: Eyes grossly normal to inspection, PERRL and conjunctivae and sclerae normal  NECK: no adenopathy, no asymmetry, masses, or scars and thyroid normal to palpation  RESP: lungs clear to auscultation - no rales, rhonchi or wheezes  CV: regular rate and rhythm, normal S1 S2, no S3 or S4, no murmur, click or rub, no peripheral edema and peripheral pulses strong  ABDOMEN: soft, nontender, no hepatosplenomegaly, no masses and bowel sounds normal  MS: no gross musculoskeletal defects noted, no edema  NEURO: Normal strength and tone, mentation intact and speech  normal  PSYCH: mentation appears normal, affect normal/bright    Results for orders placed or performed in visit on 11/01/22   Comprehensive metabolic panel (BMP + Alb, Alk Phos, ALT, AST, Total. Bili, TP)     Status: Normal   Result Value Ref Range    Sodium 142 133 - 144 mmol/L    Potassium 4.2 3.4 - 5.3 mmol/L    Chloride 108 94 - 109 mmol/L    Carbon Dioxide (CO2) 27 20 - 32 mmol/L    Anion Gap 7 3 - 14 mmol/L    Urea Nitrogen 7 7 - 30 mg/dL    Creatinine 0.85 0.52 - 1.04 mg/dL    Calcium 8.8 8.5 - 10.1 mg/dL    Glucose 82 70 - 99 mg/dL    Alkaline Phosphatase 68 40 - 150 U/L    AST 18 0 - 45 U/L    ALT 31 0 - 50 U/L    Protein Total 7.1 6.8 - 8.8 g/dL    Albumin 3.7 3.4 - 5.0 g/dL    Bilirubin Total 0.5 0.2 - 1.3 mg/dL    GFR Estimate 81 >60 mL/min/1.73m2   Lipid panel reflex to direct LDL Fasting     Status: Abnormal   Result Value Ref Range    Cholesterol 149 <200 mg/dL    Triglycerides 88 <150 mg/dL    Direct Measure HDL 44 (L) >=50 mg/dL    LDL Cholesterol Calculated 87 <=100 mg/dL    Non HDL Cholesterol 105 <130 mg/dL    Patient Fasting > 8hrs? Yes     Narrative    Cholesterol  Desirable:  <200 mg/dL    Triglycerides  Normal:  Less than 150 mg/dL  Borderline High:  150-199 mg/dL  High:  200-499 mg/dL  Very High:  Greater than or equal to 500 mg/dL    Direct Measure HDL  Female:  Greater than or equal to 50 mg/dL   Male:  Greater than or equal to 40 mg/dL    LDL Cholesterol  Desirable:  <100mg/dL  Above Desirable:  100-129 mg/dL   Borderline High:  130-159 mg/dL   High:  160-189 mg/dL   Very High:  >= 190 mg/dL    Non HDL Cholesterol  Desirable:  130 mg/dL  Above Desirable:  130-159 mg/dL  Borderline High:  160-189 mg/dL  High:  190-219 mg/dL  Very High:  Greater than or equal to 220 mg/dL   TSH with free T4 reflex     Status: Normal   Result Value Ref Range    TSH 1.54 0.40 - 4.00 mU/L

## 2022-11-02 ENCOUNTER — ANCILLARY PROCEDURE (OUTPATIENT)
Dept: ULTRASOUND IMAGING | Facility: CLINIC | Age: 52
End: 2022-11-02
Attending: FAMILY MEDICINE
Payer: COMMERCIAL

## 2022-11-02 DIAGNOSIS — R10.84 ABDOMINAL PAIN, GENERALIZED: ICD-10-CM

## 2022-11-02 LAB
ALBUMIN SERPL-MCNC: 3.7 G/DL (ref 3.4–5)
ALP SERPL-CCNC: 68 U/L (ref 40–150)
ALT SERPL W P-5'-P-CCNC: 31 U/L (ref 0–50)
ANION GAP SERPL CALCULATED.3IONS-SCNC: 7 MMOL/L (ref 3–14)
AST SERPL W P-5'-P-CCNC: 18 U/L (ref 0–45)
BILIRUB SERPL-MCNC: 0.5 MG/DL (ref 0.2–1.3)
BUN SERPL-MCNC: 7 MG/DL (ref 7–30)
CALCIUM SERPL-MCNC: 8.8 MG/DL (ref 8.5–10.1)
CHLORIDE BLD-SCNC: 108 MMOL/L (ref 94–109)
CHOLEST SERPL-MCNC: 149 MG/DL
CO2 SERPL-SCNC: 27 MMOL/L (ref 20–32)
CREAT SERPL-MCNC: 0.85 MG/DL (ref 0.52–1.04)
FASTING STATUS PATIENT QL REPORTED: YES
GFR SERPL CREATININE-BSD FRML MDRD: 81 ML/MIN/1.73M2
GLUCOSE BLD-MCNC: 82 MG/DL (ref 70–99)
HDLC SERPL-MCNC: 44 MG/DL
LDLC SERPL CALC-MCNC: 87 MG/DL
NONHDLC SERPL-MCNC: 105 MG/DL
POTASSIUM BLD-SCNC: 4.2 MMOL/L (ref 3.4–5.3)
PROT SERPL-MCNC: 7.1 G/DL (ref 6.8–8.8)
SODIUM SERPL-SCNC: 142 MMOL/L (ref 133–144)
TRIGL SERPL-MCNC: 88 MG/DL
TSH SERPL DL<=0.005 MIU/L-ACNC: 1.54 MU/L (ref 0.4–4)

## 2022-11-02 PROCEDURE — 76705 ECHO EXAM OF ABDOMEN: CPT

## 2022-11-03 ENCOUNTER — TELEPHONE (OUTPATIENT)
Dept: ALLERGY | Facility: CLINIC | Age: 52
End: 2022-11-03

## 2022-11-11 ENCOUNTER — ANCILLARY PROCEDURE (OUTPATIENT)
Dept: CT IMAGING | Facility: CLINIC | Age: 52
End: 2022-11-11
Attending: FAMILY MEDICINE
Payer: COMMERCIAL

## 2022-11-11 DIAGNOSIS — D17.71 ANGIOMYOLIPOMA OF KIDNEY: ICD-10-CM

## 2022-11-11 PROCEDURE — 255N000002 HC RX 255 OP 636: Performed by: FAMILY MEDICINE

## 2022-11-11 PROCEDURE — 74178 CT ABD&PLV WO CNTR FLWD CNTR: CPT

## 2022-11-11 RX ADMIN — IOHEXOL 100 ML: 350 INJECTION, SOLUTION INTRAVENOUS at 07:09

## 2022-12-15 ENCOUNTER — OFFICE VISIT (OUTPATIENT)
Dept: ALLERGY | Facility: CLINIC | Age: 52
End: 2022-12-15
Attending: FAMILY MEDICINE
Payer: COMMERCIAL

## 2022-12-15 ENCOUNTER — LAB (OUTPATIENT)
Dept: LAB | Facility: CLINIC | Age: 52
End: 2022-12-15
Payer: COMMERCIAL

## 2022-12-15 VITALS
SYSTOLIC BLOOD PRESSURE: 134 MMHG | OXYGEN SATURATION: 96 % | WEIGHT: 170.3 LBS | DIASTOLIC BLOOD PRESSURE: 82 MMHG | BODY MASS INDEX: 29.43 KG/M2 | HEART RATE: 77 BPM

## 2022-12-15 DIAGNOSIS — L50.9 URTICARIA: ICD-10-CM

## 2022-12-15 DIAGNOSIS — T78.3XXD ANGIOEDEMA, SUBSEQUENT ENCOUNTER: ICD-10-CM

## 2022-12-15 DIAGNOSIS — T78.3XXA ANGIO-EDEMA, INITIAL ENCOUNTER: ICD-10-CM

## 2022-12-15 DIAGNOSIS — T78.3XXA ANGIO-EDEMA, INITIAL ENCOUNTER: Primary | ICD-10-CM

## 2022-12-15 PROCEDURE — 86160 COMPLEMENT ANTIGEN: CPT | Mod: 90

## 2022-12-15 PROCEDURE — 36415 COLL VENOUS BLD VENIPUNCTURE: CPT

## 2022-12-15 PROCEDURE — 99000 SPECIMEN HANDLING OFFICE-LAB: CPT

## 2022-12-15 PROCEDURE — 99204 OFFICE O/P NEW MOD 45 MIN: CPT | Performed by: INTERNAL MEDICINE

## 2022-12-15 PROCEDURE — 86161 COMPLEMENT/FUNCTION ACTIVITY: CPT | Mod: 90

## 2022-12-15 PROCEDURE — 86003 ALLG SPEC IGE CRUDE XTRC EA: CPT | Mod: 90

## 2022-12-15 PROCEDURE — 86003 ALLG SPEC IGE CRUDE XTRC EA: CPT | Mod: 59

## 2022-12-15 PROCEDURE — 83520 IMMUNOASSAY QUANT NOS NONAB: CPT

## 2022-12-15 PROCEDURE — 86160 COMPLEMENT ANTIGEN: CPT

## 2022-12-15 RX ORDER — PREDNISONE 50 MG/1
TABLET ORAL
Qty: 5 TABLET | Refills: 0 | Status: SHIPPED | OUTPATIENT
Start: 2022-12-15 | End: 2023-09-06

## 2022-12-15 ASSESSMENT — ENCOUNTER SYMPTOMS
RHINORRHEA: 0
HEADACHES: 0
FEVER: 0
DIARRHEA: 0
ADENOPATHY: 0
EYE DISCHARGE: 0
EYE REDNESS: 0
FACIAL SWELLING: 0
ARTHRALGIAS: 0
NAUSEA: 0
SINUS PRESSURE: 0
EYE ITCHING: 0
COUGH: 0
JOINT SWELLING: 0
ACTIVITY CHANGE: 0
CHILLS: 0
SHORTNESS OF BREATH: 0
CHEST TIGHTNESS: 0
WHEEZING: 0
MYALGIAS: 0
VOMITING: 0

## 2022-12-15 NOTE — LETTER
12/15/2022         RE: David Oro  [address] [address] [facility] [address]        Dear Colleague,    Thank you for referring your patient, David Oro, to the [facility] [place]. Please see a copy of my visit note below.    David Oro was seen in the Allergy Clinic at [facility].    David Oro is a 52 year old female being seen today at the request of Kenna Soto MD [facility]  in consultation for Angioedema.    Since October she has had problems with swelling.  October 7 she presented to the emergency department.  On 10/5/22 she had some lip swelling which progressed the following day and then ended up as tongue swelling on the 7th.  She was treated with Decadron and famotidine.  Also given prednisone for 5 days.  She does have an EpiPen.    She also has some intermittent hives that seem to be unrelated to the swelling.  This happens once per week.  Associated with itching and they are red and raised.  Lab work including a CBC, complete metabolic panel and a TSH were all normal.    No preceding events that she can consider that could be contributing to her symptoms.  No new medications and no illnesses and no vaccines given around that time.  Not taking any over-the-counter medications to correlate with these symptoms.      Past Medical History:   Diagnosis Date     Chronic idiopathic constipation 12/2/2016     DUB (dysfunctional uterine bleeding) 2010     Hot flashes 12/2/2016     Leiomyoma of uterus 2/26/09    POSTERIOR FIBROID 1.5 X 1.1 X 0.8 CM     RhD negative      Tremor of both hands 12/2/2016     Uterine polyp 8/2/04    HAD A HYSTEROSCOPY , D AND C FOR A UTERINE POLYP     Family History   Problem Relation Age of Onset     Diabetes Paternal Grandmother      Coronary Artery Disease Paternal Grandmother      Hypertension Paternal Grandmother      Hypertension Mother       Osteoporosis Mother      Hypertension Father      Other Cancer Father         pancreatic cancer     Hypertension Maternal Grandfather      Other Cancer Maternal Grandfather      Hypertension Paternal Grandfather      Other Cancer Maternal Grandmother         cervical     Uterine Cancer Maternal Grandmother      Past Surgical History:   Procedure Laterality Date     dilation and curretage  8/2/04    UTERINE POLYP REMOVED; PATH: BENIGN POLYP,PROLIFERATIVE ENDOMETRIUM     ENDOMETRIAL SAMPLING (BIOPSY)  9/21/10    SECRETORY ENDOMETRIUM     GYN SURGERY  2006    Removing Fibroid     HYSTEROSCOPY  8/2/04    UTERINE POLYP REMOVED     ORTHOPEDIC SURGERY  1975    Broken left arm       ENVIRONMENTAL HISTORY:   Pets inside the house include 1 dog(s).  Do you smoke cigarettes or other recreational drugs? No There is/are 0 smokers living in the house. The house does not have a damp basement.     SOCIAL HISTORY:   David is employed as . She lives with her .      Review of Systems   Constitutional: Negative for activity change, chills and fever.   HENT: Negative for congestion, dental problem, ear pain, facial swelling, nosebleeds, postnasal drip, rhinorrhea, sinus pressure and sneezing.    Eyes: Negative for discharge, redness and itching.   Respiratory: Negative for cough, chest tightness, shortness of breath and wheezing.    Cardiovascular: Negative for chest pain.   Gastrointestinal: Negative for diarrhea, nausea and vomiting.   Musculoskeletal: Negative for arthralgias, joint swelling and myalgias.        Positive for Hives and itchy skin   Skin: Negative for rash.   Neurological: Negative for headaches.   Hematological: Negative for adenopathy.   Psychiatric/Behavioral: Negative for behavioral problems and self-injury.         Current Outpatient Medications:      predniSONE (DELTASONE) 50 MG tablet, Take 1 tab as needed for swelling, Disp: 5 tablet, Rfl: 0     diphenhydrAMINE (BENADRYL) 25 MG tablet,  Take 25 mg by mouth every 6 hours as needed (Patient not taking: Reported on 12/15/2022), Disp: , Rfl:      EPINEPHrine (ANY BX GENERIC EQUIV) 0.3 MG/0.3ML injection 2-pack, Inject 0.3 mLs (0.3 mg) into the muscle once as needed for anaphylaxis May repeat one time in 5-15 minutes if response to initial dose is inadequate., Disp: 2 each, Rfl: 0     predniSONE (DELTASONE) 20 MG tablet, Take two tablets (= 40mg) each day for 5 (five) days (Patient not taking: Reported on 12/15/2022), Disp: 10 tablet, Rfl: 0  No Known Allergies      EXAM:   /82   Pulse 77   Wt 77.2 kg (170 lb 4.8 oz)   LMP 12/02/2019   SpO2 96%   BMI 29.43 kg/m      Physical Exam    Constitutional:       General: She is not in acute distress.     Appearance: Normal appearance. She is not ill-appearing.   HENT:      Head: Normocephalic and atraumatic.      Nose: Nose normal. No congestion or rhinorrhea.      Mouth/Throat:      Mouth: Mucous membranes are moist.      Pharynx: Oropharynx is clear. No posterior oropharyngeal erythema.   Eyes:      General:         Right eye: No discharge.         Left eye: No discharge.   Cardiovascular:      Rate and Rhythm: Normal rate and regular rhythm.      Heart sounds: Normal heart sounds.   Pulmonary:      Effort: Pulmonary effort is normal.      Breath sounds: Normal breath sounds. No wheezing or rhonchi.   Skin:     General: Skin is warm.      Findings: No erythema or rash.   Neurological:      General: No focal deficit present.      Mental Status: She is alert. Mental status is at baseline.   Psychiatric:         Mood and Affect: Mood normal.         Behavior: Behavior normal.       ASSESSMENT/PLAN:  David Oro is a 52 year old female seen today for angioedema which she feels may be correlated with extra ingredients in ice cream (not dairy).  Also certain bread products cause symptoms but most breads do not (concerned about yeast).  She has had angioedema of the lips and tongue on  several different occasions.  She has been to the emergency department.  She also has intermittent hives.  Will evaluate with additional blood test to see if we can determine answer.  Developed a treatment plan as described below.    1. Start Zyrtec 10 mg at night, may take an extra dose if you have swelling or hives  2. Will check labs for swelling  3. Prednisone 50 mg x 1 for swelling    Follow-up in 3 weeks      Thank you for allowing me to participate in the care of David Oro.      I spent 30 minutes on the date of the encounter doing chart review, history and exam, documentation and further coordination as noted above exclusive of separately reported interpretations    Brandon Bravo MD  Allergy/Immunology  [facility]        Again, thank you for allowing me to participate in the care of your patient.        Sincerely,        Brandon Bravo MD

## 2022-12-15 NOTE — PATIENT INSTRUCTIONS
Start Zyrtec 10 mg at night, may take an extra dose if you have swelling or hives  Will check labs for swelling  Prednisone 50 mg x 1 for swelling        Allergy Staff Appt Hours Shot Hours Location         Physician   Brandon Bravo MD      Support Staff   Ninfa RICHMOND, RN   Hakeem GERONIMO, RN   Jonathan KWONG, MA   Owen PHELPS, LPN          Mondays  Not available until January/2023 Wednesdays  Close    Tuesdays Thursdays and Fridays:  Sylvia 7-5                    Sylvia     Tuesdays: 7:40-3:20      Fridays: 7:40-4:20                [facility]  [address]  [place] [address]  Appt Line: [phone]    Pulmonary Function Scheduling:  [place]: [phone]         Questions about cost of your care?  For questions about your cost of your visit, procedure, lab or imaging contact:  [place] Consumer Price Line [phone] or visit: [email or web address]

## 2022-12-15 NOTE — PROGRESS NOTES
David Oro was seen in the Allergy Clinic at [facility].    David Oro is a 52 year old female being seen today at the request of Kenna Soto MD [facility]  in consultation for Angioedema.    Since October she has had problems with swelling.  October 7 she presented to the emergency department.  On 10/5/22 she had some lip swelling which progressed the following day and then ended up as tongue swelling on the 7th.  She was treated with Decadron and famotidine.  Also given prednisone for 5 days.  She does have an EpiPen.    She also has some intermittent hives that seem to be unrelated to the swelling.  This happens once per week.  Associated with itching and they are red and raised.  Lab work including a CBC, complete metabolic panel and a TSH were all normal.    No preceding events that she can consider that could be contributing to her symptoms.  No new medications and no illnesses and no vaccines given around that time.  Not taking any over-the-counter medications to correlate with these symptoms.      Past Medical History:   Diagnosis Date     Chronic idiopathic constipation 12/2/2016     DUB (dysfunctional uterine bleeding) 2010     Hot flashes 12/2/2016     Leiomyoma of uterus 2/26/09    POSTERIOR FIBROID 1.5 X 1.1 X 0.8 CM     RhD negative      Tremor of both hands 12/2/2016     Uterine polyp 8/2/04    HAD A HYSTEROSCOPY , D AND C FOR A UTERINE POLYP     Family History   Problem Relation Age of Onset     Diabetes Paternal Grandmother      Coronary Artery Disease Paternal Grandmother      Hypertension Paternal Grandmother      Hypertension Mother      Osteoporosis Mother      Hypertension Father      Other Cancer Father         pancreatic cancer     Hypertension Maternal Grandfather      Other Cancer Maternal Grandfather      Hypertension Paternal Grandfather      Other Cancer Maternal Grandmother         cervical     Uterine Cancer  Maternal Grandmother      Past Surgical History:   Procedure Laterality Date     dilation and curretage  8/2/04    UTERINE POLYP REMOVED; PATH: BENIGN POLYP,PROLIFERATIVE ENDOMETRIUM     ENDOMETRIAL SAMPLING (BIOPSY)  9/21/10    SECRETORY ENDOMETRIUM     GYN SURGERY  2006    Removing Fibroid     HYSTEROSCOPY  8/2/04    UTERINE POLYP REMOVED     ORTHOPEDIC SURGERY  1975    Broken left arm       ENVIRONMENTAL HISTORY:   Pets inside the house include 1 dog(s).  Do you smoke cigarettes or other recreational drugs? No There is/are 0 smokers living in the house. The house does not have a damp basement.     SOCIAL HISTORY:   David is employed as . She lives with her .      Review of Systems   Constitutional: Negative for activity change, chills and fever.   HENT: Negative for congestion, dental problem, ear pain, facial swelling, nosebleeds, postnasal drip, rhinorrhea, sinus pressure and sneezing.    Eyes: Negative for discharge, redness and itching.   Respiratory: Negative for cough, chest tightness, shortness of breath and wheezing.    Cardiovascular: Negative for chest pain.   Gastrointestinal: Negative for diarrhea, nausea and vomiting.   Musculoskeletal: Negative for arthralgias, joint swelling and myalgias.        Positive for Hives and itchy skin   Skin: Negative for rash.   Neurological: Negative for headaches.   Hematological: Negative for adenopathy.   Psychiatric/Behavioral: Negative for behavioral problems and self-injury.         Current Outpatient Medications:      predniSONE (DELTASONE) 50 MG tablet, Take 1 tab as needed for swelling, Disp: 5 tablet, Rfl: 0     diphenhydrAMINE (BENADRYL) 25 MG tablet, Take 25 mg by mouth every 6 hours as needed (Patient not taking: Reported on 12/15/2022), Disp: , Rfl:      EPINEPHrine (ANY BX GENERIC EQUIV) 0.3 MG/0.3ML injection 2-pack, Inject 0.3 mLs (0.3 mg) into the muscle once as needed for anaphylaxis May repeat one time in 5-15 minutes if  response to initial dose is inadequate., Disp: 2 each, Rfl: 0     predniSONE (DELTASONE) 20 MG tablet, Take two tablets (= 40mg) each day for 5 (five) days (Patient not taking: Reported on 12/15/2022), Disp: 10 tablet, Rfl: 0  No Known Allergies      EXAM:   /82   Pulse 77   Wt 77.2 kg (170 lb 4.8 oz)   LMP 12/02/2019   SpO2 96%   BMI 29.43 kg/m      Physical Exam    Constitutional:       General: She is not in acute distress.     Appearance: Normal appearance. She is not ill-appearing.   HENT:      Head: Normocephalic and atraumatic.      Nose: Nose normal. No congestion or rhinorrhea.      Mouth/Throat:      Mouth: Mucous membranes are moist.      Pharynx: Oropharynx is clear. No posterior oropharyngeal erythema.   Eyes:      General:         Right eye: No discharge.         Left eye: No discharge.   Cardiovascular:      Rate and Rhythm: Normal rate and regular rhythm.      Heart sounds: Normal heart sounds.   Pulmonary:      Effort: Pulmonary effort is normal.      Breath sounds: Normal breath sounds. No wheezing or rhonchi.   Skin:     General: Skin is warm.      Findings: No erythema or rash.   Neurological:      General: No focal deficit present.      Mental Status: She is alert. Mental status is at baseline.   Psychiatric:         Mood and Affect: Mood normal.         Behavior: Behavior normal.       ASSESSMENT/PLAN:  David Oro is a 52 year old female seen today for angioedema which she feels may be correlated with extra ingredients in ice cream (not dairy).  Also certain bread products cause symptoms but most breads do not (concerned about yeast).  She has had angioedema of the lips and tongue on several different occasions.  She has been to the emergency department.  She also has intermittent hives.  Will evaluate with additional blood test to see if we can determine answer.  Developed a treatment plan as described below.    1. Start Zyrtec 10 mg at night, may take an extra dose  if you have swelling or hives  2. Will check labs for swelling  3. Prednisone 50 mg x 1 for swelling    Follow-up in 3 weeks      Thank you for allowing me to participate in the care of David Oro.      I spent 30 minutes on the date of the encounter doing chart review, history and exam, documentation and further coordination as noted above exclusive of separately reported interpretations    Brandon Bravo MD  Allergy/Immunology  [facility]

## 2022-12-16 LAB
C1INH SERPL-MCNC: 41 MG/DL
C4 SERPL-MCNC: 23 MG/DL (ref 13–39)

## 2022-12-17 LAB
BAKER'S YEAST IGE QN: <0.1 KU/L
DEPRECATED MISC ALLERGEN IGE RAST QL: NORMAL

## 2022-12-18 LAB — C1INH ACT/NOR SERPL: 103 %

## 2022-12-19 LAB
D FARINAE IGE QN: <0.1 KU(A)/L
D PTERONYSS IGE QN: <0.1 KU(A)/L
DOG DANDER+EPITH IGE QN: <0.1 KU(A)/L

## 2022-12-20 LAB — TRYPTASE SERPL-MCNC: 3.7 UG/L

## 2023-01-05 ENCOUNTER — OFFICE VISIT (OUTPATIENT)
Dept: ALLERGY | Facility: CLINIC | Age: 53
End: 2023-01-05
Payer: COMMERCIAL

## 2023-01-05 VITALS
WEIGHT: 170.19 LBS | SYSTOLIC BLOOD PRESSURE: 109 MMHG | DIASTOLIC BLOOD PRESSURE: 76 MMHG | OXYGEN SATURATION: 95 % | BODY MASS INDEX: 29.42 KG/M2 | HEART RATE: 77 BPM

## 2023-01-05 DIAGNOSIS — L50.9 URTICARIA: ICD-10-CM

## 2023-01-05 DIAGNOSIS — T78.3XXD ANGIOEDEMA, SUBSEQUENT ENCOUNTER: Primary | ICD-10-CM

## 2023-01-05 PROCEDURE — 99214 OFFICE O/P EST MOD 30 MIN: CPT | Performed by: INTERNAL MEDICINE

## 2023-01-05 RX ORDER — CETIRIZINE HYDROCHLORIDE 10 MG/1
10 TABLET ORAL AT BEDTIME
COMMUNITY

## 2023-01-05 NOTE — PATIENT INSTRUCTIONS
Continue Zyrtec 10 mg at night, may take an extra dose if you have swelling or hives  Prednisone 50 mg x 1 for swelling as needed  If symptoms are worsening, Xolair may be considered  May stop Zyrtec if no hives or swelling for 1 month.        Allergy Staff Appt Hours Shot Hours Location         Physician   Brandon Bravo MD      Support Staff   Ninfa RICHMOND, RN   Hakeem GERONIMO, RN   Jonathan KWONG, MA   Owen HPELPS, LPN          Mondays  Not available until January/2023 Wednesdays  Close    Tuesdays Thursdays and Fridays:  [place] 7-5                    [place]     Tuesdays: 7:40-3:20      Fridays: 7:40-4:20                [facility]  [address]  [place] [address]  Appt Line: [phone]    Pulmonary Function Scheduling:  [place]: [phone]         Questions about cost of your care?  For questions about your cost of your visit, procedure, lab or imaging contact:  [employer] [facility] [place] Consumer Price Line [phone] or visit: [email or web address]

## 2023-01-05 NOTE — LETTER
1/5/2023         RE: David Oro  [address] [address] [address]        Dear Colleague,    Thank you for referring your patient, David Oro, to the [facility] [place]. Please see a copy of my visit note below.    David Oro was seen in the Allergy Clinic at [facility].    David Oro is a 52 year old female being seen today for ongoing evaluation form Angioedema and hives.    Since the last visit the patient has had improvement.  Due to lip and tongue swelling as well as intermittent urticaria she has had significant lab work.  CBC, complete metabolic panel, and TSH were all normal.  C4, C1 esterase studies, tryptase, Brewers yeast IgE, dust mite IgE, and dog IgE were all tested and normal.    At the last appointment, recommended to take Zyrtec 10 mg at night and then as needed to see if this helps minimize or reduce the amount of swelling or hives.  She has found that it has reduced her hive episodes.  She is only having hives on 1 occasion since last seen and were short lived.  She has had 3 episodes of lip swelling since last seen. One that lasted 24 hours and 2 that lasted minutes only.  She has not taken any additional Zyrtec or prednisone with these events.    She still feels that this is a bread related problem.  For example the [employer] brand country French bread or French baguette seem to cause symptoms within 10 to 15 minutes.    She has not had any new onset of symptoms such as fevers, chills, unexplained weight loss, abdominal pain, nausea, vomiting or diarrhea.    Past Medical History:   Diagnosis Date     Chronic idiopathic constipation 12/2/2016     DUB (dysfunctional uterine bleeding) 2010     Hot flashes 12/2/2016     Leiomyoma of uterus 2/26/09    POSTERIOR FIBROID 1.5 X 1.1 X 0.8 CM     RhD negative      Tremor of both hands 12/2/2016     Uterine polyp 8/2/04    HAD A HYSTEROSCOPY  , D AND C FOR A UTERINE POLYP     Family History   Problem Relation Age of Onset     Diabetes Paternal Grandmother      Coronary Artery Disease Paternal Grandmother      Hypertension Paternal Grandmother      Hypertension Mother      Osteoporosis Mother      Hypertension Father      Other Cancer Father         pancreatic cancer     Hypertension Maternal Grandfather      Other Cancer Maternal Grandfather      Hypertension Paternal Grandfather      Other Cancer Maternal Grandmother         cervical     Uterine Cancer Maternal Grandmother      Past Surgical History:   Procedure Laterality Date     dilation and curretage  8/2/04    UTERINE POLYP REMOVED; PATH: BENIGN POLYP,PROLIFERATIVE ENDOMETRIUM     ENDOMETRIAL SAMPLING (BIOPSY)  9/21/10    SECRETORY ENDOMETRIUM     GYN SURGERY  2006    Removing Fibroid     HYSTEROSCOPY  8/2/04    UTERINE POLYP REMOVED     ORTHOPEDIC SURGERY  1975    Broken left arm         Current Outpatient Medications:      cetirizine (ZYRTEC) 10 MG tablet, Take 10 mg by mouth At Bedtime, Disp: , Rfl:      diphenhydrAMINE (BENADRYL) 25 MG tablet, Take 25 mg by mouth every 6 hours as needed (Patient not taking: Reported on 12/15/2022), Disp: , Rfl:      EPINEPHrine (ANY BX GENERIC EQUIV) 0.3 MG/0.3ML injection 2-pack, Inject 0.3 mLs (0.3 mg) into the muscle once as needed for anaphylaxis May repeat one time in 5-15 minutes if response to initial dose is inadequate., Disp: 2 each, Rfl: 0     predniSONE (DELTASONE) 20 MG tablet, Take two tablets (= 40mg) each day for 5 (five) days (Patient not taking: Reported on 12/15/2022), Disp: 10 tablet, Rfl: 0     predniSONE (DELTASONE) 50 MG tablet, Take 1 tab as needed for swelling (Patient not taking: Reported on 1/5/2023), Disp: 5 tablet, Rfl: 0  No Known Allergies      EXAM:   /76   Pulse 77   Wt 77.2 kg (170 lb 3.1 oz)   LMP 12/02/2019   SpO2 95%   BMI 29.42 kg/m      Constitutional:       General: She is not in acute distress.     Appearance:  Normal appearance. She is not ill-appearing.   HENT:      Head: Normocephalic and atraumatic.      Mouth/Throat:      Mouth: Mucous membranes are moist.      Pharynx: Oropharynx is clear. No posterior oropharyngeal erythema.   Eyes:      General:         Right eye: No discharge.         Left eye: No discharge.   Cardiovascular:      Rate and Rhythm: Normal rate and regular rhythm.      Heart sounds: Normal heart sounds.   Pulmonary:      Effort: Pulmonary effort is normal.      Breath sounds: Normal breath sounds. No wheezing or rhonchi.   Skin:     General: Skin is warm.      Findings: No erythema or rash.   Neurological:      General: No focal deficit present.      Mental Status: She is alert. Mental status is at baseline.   Psychiatric:         Mood and Affect: Mood normal.         Behavior: Behavior normal.      Latest Reference Range & Units 12/15/22 11:02   W0Byzevbfi Inhib Funct >=41 % 103   P5Kseewaow Inhibitor 21 - 38 mg/dL 41 (H)   Tryptase <11.0 ug/L 3.7   Allergen Parsons's Yeast, IgE <=0.34 kU/L <0.10   Allergen D farinae <0.10 KU(A)/L <0.10   Allergen, D Pteronyssinus <0.10 KU(A)/L <0.10   Allergen Dog Dander <0.10 KU(A)/L <0.10   Allergen, Interp, Immunocap Score IgE  See Note   Complement C4 13 - 39 mg/dL 23   (H): Data is abnormally high    ASSESSMENT/PLAN:  David Oro is a 52 year old female seen today for ongoing evaluation of angioedema as well as urticaria.  Getting mild improvement with the addition of Zyrtec on a daily basis.  Discussed multiple options today but I do not feel that any other testing is warranted.  Unable to determine what ingredient might be the bread products that she is concerned about but she will continue to avoid that brand.    1. Continue Zyrtec 10 mg at night, may take an extra dose if you have swelling or hives  2. Prednisone 50 mg x 1 for swelling as needed if not improving with extra Zyrtec.  3. If symptoms are worsening, Xolair may be  considered  4. May stop Zyrtec if no hives or swelling for 1 month.    Follow-up in 6 months      Thank you for allowing me to participate in the care of David Oro.      I spent 25 minutes on the date of the encounter doing chart review, history and exam, documentation and further coordination as noted above exclusive of separately reported interpretations    Brandon Bravo MD  Allergy/Immunology  [facility]      Again, thank you for allowing me to participate in the care of your patient.        Sincerely,        Brandon Bravo MD

## 2023-01-05 NOTE — PROGRESS NOTES
David Oro was seen in the Allergy Clinic at [facility].    David Oro is a 52 year old female being seen today for ongoing evaluation form Angioedema and hives.    Since the last visit the patient has had improvement.  Due to lip and tongue swelling as well as intermittent urticaria she has had significant lab work.  CBC, complete metabolic panel, and TSH were all normal.  C4, C1 esterase studies, tryptase, Brewers yeast IgE, dust mite IgE, and dog IgE were all tested and normal.    At the last appointment, recommended to take Zyrtec 10 mg at night and then as needed to see if this helps minimize or reduce the amount of swelling or hives.  She has found that it has reduced her hive episodes.  She is only having hives on 1 occasion since last seen and were short lived.  She has had 3 episodes of lip swelling since last seen. One that lasted 24 hours and 2 that lasted minutes only.  She has not taken any additional Zyrtec or prednisone with these events.    She still feels that this is a bread related problem.  For example the [employer] brand country French bread or French baguette seem to cause symptoms within 10 to 15 minutes.    She has not had any new onset of symptoms such as fevers, chills, unexplained weight loss, abdominal pain, nausea, vomiting or diarrhea.    Past Medical History:   Diagnosis Date     Chronic idiopathic constipation 12/2/2016     DUB (dysfunctional uterine bleeding) 2010     Hot flashes 12/2/2016     Leiomyoma of uterus 2/26/09    POSTERIOR FIBROID 1.5 X 1.1 X 0.8 CM     RhD negative      Tremor of both hands 12/2/2016     Uterine polyp 8/2/04    HAD A HYSTEROSCOPY , D AND C FOR A UTERINE POLYP     Family History   Problem Relation Age of Onset     Diabetes Paternal Grandmother      Coronary Artery Disease Paternal Grandmother      Hypertension Paternal Grandmother      Hypertension Mother      Osteoporosis Mother      Hypertension Father       Other Cancer Father         pancreatic cancer     Hypertension Maternal Grandfather      Other Cancer Maternal Grandfather      Hypertension Paternal Grandfather      Other Cancer Maternal Grandmother         cervical     Uterine Cancer Maternal Grandmother      Past Surgical History:   Procedure Laterality Date     dilation and curretage  8/2/04    UTERINE POLYP REMOVED; PATH: BENIGN POLYP,PROLIFERATIVE ENDOMETRIUM     ENDOMETRIAL SAMPLING (BIOPSY)  9/21/10    SECRETORY ENDOMETRIUM     GYN SURGERY  2006    Removing Fibroid     HYSTEROSCOPY  8/2/04    UTERINE POLYP REMOVED     ORTHOPEDIC SURGERY  1975    Broken left arm         Current Outpatient Medications:      cetirizine (ZYRTEC) 10 MG tablet, Take 10 mg by mouth At Bedtime, Disp: , Rfl:      diphenhydrAMINE (BENADRYL) 25 MG tablet, Take 25 mg by mouth every 6 hours as needed (Patient not taking: Reported on 12/15/2022), Disp: , Rfl:      EPINEPHrine (ANY BX GENERIC EQUIV) 0.3 MG/0.3ML injection 2-pack, Inject 0.3 mLs (0.3 mg) into the muscle once as needed for anaphylaxis May repeat one time in 5-15 minutes if response to initial dose is inadequate., Disp: 2 each, Rfl: 0     predniSONE (DELTASONE) 20 MG tablet, Take two tablets (= 40mg) each day for 5 (five) days (Patient not taking: Reported on 12/15/2022), Disp: 10 tablet, Rfl: 0     predniSONE (DELTASONE) 50 MG tablet, Take 1 tab as needed for swelling (Patient not taking: Reported on 1/5/2023), Disp: 5 tablet, Rfl: 0  No Known Allergies      EXAM:   /76   Pulse 77   Wt 77.2 kg (170 lb 3.1 oz)   LMP 12/02/2019   SpO2 95%   BMI 29.42 kg/m      Constitutional:       General: She is not in acute distress.     Appearance: Normal appearance. She is not ill-appearing.   HENT:      Head: Normocephalic and atraumatic.      Mouth/Throat:      Mouth: Mucous membranes are moist.      Pharynx: Oropharynx is clear. No posterior oropharyngeal erythema.   Eyes:      General:         Right eye: No discharge.          Left eye: No discharge.   Cardiovascular:      Rate and Rhythm: Normal rate and regular rhythm.      Heart sounds: Normal heart sounds.   Pulmonary:      Effort: Pulmonary effort is normal.      Breath sounds: Normal breath sounds. No wheezing or rhonchi.   Skin:     General: Skin is warm.      Findings: No erythema or rash.   Neurological:      General: No focal deficit present.      Mental Status: She is alert. Mental status is at baseline.   Psychiatric:         Mood and Affect: Mood normal.         Behavior: Behavior normal.      Latest Reference Range & Units 12/15/22 11:02   Y5Fcxotkvt Inhib Funct >=41 % 103   G8Ilkwhxws Inhibitor 21 - 38 mg/dL 41 (H)   Tryptase <11.0 ug/L 3.7   Allergen Parsons's Yeast, IgE <=0.34 kU/L <0.10   Allergen D farinae <0.10 KU(A)/L <0.10   Allergen, D Pteronyssinus <0.10 KU(A)/L <0.10   Allergen Dog Dander <0.10 KU(A)/L <0.10   Allergen, Interp, Immunocap Score IgE  See Note   Complement C4 13 - 39 mg/dL 23   (H): Data is abnormally high    ASSESSMENT/PLAN:  David Oro is a 52 year old female seen today for ongoing evaluation of angioedema as well as urticaria.  Getting mild improvement with the addition of Zyrtec on a daily basis.  Discussed multiple options today but I do not feel that any other testing is warranted.  Unable to determine what ingredient might be the bread products that she is concerned about but she will continue to avoid that brand.    1. Continue Zyrtec 10 mg at night, may take an extra dose if you have swelling or hives  2. Prednisone 50 mg x 1 for swelling as needed if not improving with extra Zyrtec.  3. If symptoms are worsening, Xolair may be considered  4. May stop Zyrtec if no hives or swelling for 1 month.    Follow-up in 6 months      Thank you for allowing me to participate in the care of David Oro.      I spent 25 minutes on the date of the encounter doing chart review, history and exam, documentation and  further coordination as noted above exclusive of separately reported interpretations    Brandon Bravo MD  Allergy/Immunology  [facility]

## 2023-03-19 NOTE — MR AVS SNAPSHOT
After Visit Summary   3/28/2018    David Oro    MRN: [id number]           Patient Information     Date Of Birth          1970        Visit Information        Provider Department      3/28/2018 10:30 AM Kenna Soto MD [facility] [place] [facility]        Today's Diagnoses     Need for prophylactic vaccination with tetanus-diphtheria (TD)    -  1    Encounter for routine adult health examination without abnormal findings        Rash        Hot flashes          Care Instructions      Preventive Health Recommendations  Female Ages 40 to 49    Yearly exam:     See your health care provider every year in order to  1. Review health changes.   2. Discuss preventive care.    3. Review your medicines if your doctor prescribed any.      Get a Pap test every three years (unless you have an abnormal result and your provider advises testing more often).      If you get Pap tests with HPV test, you only need to test every 5 years, unless you have an abnormal result. You do not need a Pap test if your uterus was removed (hysterectomy) and you have not had cancer.      You should be tested each year for STDs (sexually transmitted diseases), if you're at risk.       Ask your doctor if you should have a mammogram.      Have a colonoscopy (test for colon cancer) if someone in your family has had colon cancer or polyps before age 50.       Have a cholesterol test every 5 years.       Have a diabetes test (fasting glucose) after age 45. If you are at risk for diabetes, you should have this test every 3 years.    Shots: Get a flu shot each year. Get a tetanus shot every 10 years.     Nutrition:     Eat at least 5 servings of fruits and vegetables each day.    Eat whole-grain bread, whole-wheat pasta and brown rice instead of white grains and rice.    Talk to your provider about Calcium and Vitamin D.     Lifestyle    Exercise at least 150 minutes a week (an average of 30 minutes a day, 5 days a week). This  "will help you control your weight and prevent disease.    Limit alcohol to one drink per day.    No smoking.     Wear sunscreen to prevent skin cancer.    See your dentist every six months for an exam and cleaning.          Follow-ups after your visit        Who to contact     If you have questions or need follow up information about today's clinic visit or your schedule please contact [facility] directly at [phone].  Normal or non-critical lab and imaging results will be communicated to you by [employer] [facility], letter or phone within 4 business days after the clinic has received the results. If you do not hear from us within 7 days, please contact the clinic through [employer] or phone. If you have a critical or abnormal lab result, we will notify you by phone as soon as possible.  Submit refill requests through [employer] or call your pharmacy and they will forward the refill request to us. Please allow 3 business days for your refill to be completed.          Additional Information About Your Visit        [facility] Information     [facility] gives you secure access to your electronic health record. If you see a primary care provider, you can also send messages to your care team and make appointments. If you have questions, please call your primary care clinic.  If you do not have a primary care provider, please call [phone] and they will assist you.        Care EveryWhere ID     This is your Care EveryWhere ID. This could be used by other organizations to access your [facility] medical records  [id number]        Your Vitals Were     Pulse Temperature Respirations Height Last Period Pulse Oximetry    84 98.3  F (36.8  C) (Tympanic) 16 5' 4.01\" (1.626 m) 03/28/2017 (Approximate) 95%    Breastfeeding? BMI (Body Mass Index)                No 28.79 kg/m2           Blood Pressure from Last 3 Encounters:   03/28/18 102/74   12/02/16 96/68   02/18/14 116/76    Weight from Last 3 Encounters:   03/28/18 167 lb 12.8 oz " (76.1 kg)   12/02/16 147 lb 9.6 oz (67 kg)   02/18/14 161 lb (73 kg)              We Performed the Following     Comprehensive metabolic panel (BMP + Alb, Alk Phos, ALT, AST, Total. Bili, TP)     Lipid Profile (Chol, Trig, HDL, LDL calc)     OFFICE/OUTPT VISIT,EST,LEVL II     TDAP VACCINE (ADACEL)     TSH with free T4 reflex          Today's Medication Changes          These changes are accurate as of 3/28/18  3:26 PM.  If you have any questions, ask your nurse or doctor.               Start taking these medicines.        Dose/Directions    emollient cream   Used for:  Rash   Started by:  Kenna Soto MD        Apply topically as needed for other   Quantity:  100 g   Refills:  1       mupirocin 2 % ointment   Commonly known as:  BACTROBAN   Used for:  Rash   Started by:  Kenna Soto MD        Apply three times daily on the rash for five to 7 days   Quantity:  30 g   Refills:  1            Where to get your medicines      [facility] [address] [place] [address] [address] [place] [id number]    Hours:  24-hours Phone:  [phone]     emollient cream    mupirocin 2 % ointment                Primary Care Provider Office Phone # Fax #    Kenna Soto [phone] [address] [address]        Equal Access to Services     [facility] AH: Hadii aad ku hadasho Soomaali, waaxda luqadaha, qaybta kaalmada adeegyada, pierce idiin haymilan dorian lewis . So [facility] [phone].    ATENCIÓN: Si habla español, tiene a colón disposición servicios gratuitos de asistencia lingüística. Llame al [phone].    We comply with applicable federal civil rights laws and [place] laws. We do not discriminate on the basis of race, color, national origin, age, disability, sex, sexual orientation, or gender identity.            Thank you!     Thank you for choosing [facility]  for your care.  Our goal is always to provide you with excellent care. Hearing back from our patients is one way we can continue to improve our services. Please take a few minutes to complete the written survey that you may receive in the mail after your visit with us. Thank you!             Your Updated Medication List - Protect others around you: Learn how to safely use, store and throw away your medicines at www.disposemymeds.org.          This list is accurate as of 3/28/18  3:26 PM.  Always use your most recent med list.                   Brand Name Dispense Instructions for use Diagnosis    emollient cream     100 g    Apply topically as needed for other    Rash       mupirocin 2 % ointment    BACTROBAN    30 g    Apply three times daily on the rash for five to 7 days    Rash          General

## 2023-06-24 ENCOUNTER — HEALTH MAINTENANCE LETTER (OUTPATIENT)
Age: 53
End: 2023-06-24

## 2023-09-06 ENCOUNTER — OFFICE VISIT (OUTPATIENT)
Dept: FAMILY MEDICINE | Facility: CLINIC | Age: 53
End: 2023-09-06
Payer: COMMERCIAL

## 2023-09-06 VITALS
TEMPERATURE: 97.7 F | WEIGHT: 173 LBS | RESPIRATION RATE: 16 BRPM | SYSTOLIC BLOOD PRESSURE: 104 MMHG | OXYGEN SATURATION: 100 % | HEART RATE: 68 BPM | BODY MASS INDEX: 29.53 KG/M2 | DIASTOLIC BLOOD PRESSURE: 72 MMHG | HEIGHT: 64 IN

## 2023-09-06 DIAGNOSIS — R74.8 ELEVATED LIVER ENZYMES: ICD-10-CM

## 2023-09-06 DIAGNOSIS — H65.91 OME (OTITIS MEDIA WITH EFFUSION), RIGHT: Primary | ICD-10-CM

## 2023-09-06 DIAGNOSIS — Z83.3 FAMILY HISTORY OF DIABETES MELLITUS: ICD-10-CM

## 2023-09-06 DIAGNOSIS — R53.83 OTHER FATIGUE: ICD-10-CM

## 2023-09-06 LAB
ALBUMIN SERPL BCG-MCNC: 4.1 G/DL (ref 3.5–5.2)
ALP SERPL-CCNC: 90 U/L (ref 35–104)
ALT SERPL W P-5'-P-CCNC: 66 U/L (ref 0–50)
ANION GAP SERPL CALCULATED.3IONS-SCNC: 10 MMOL/L (ref 7–15)
AST SERPL W P-5'-P-CCNC: 35 U/L (ref 0–45)
BILIRUB SERPL-MCNC: 0.5 MG/DL
BUN SERPL-MCNC: 11.3 MG/DL (ref 6–20)
CALCIUM SERPL-MCNC: 9.1 MG/DL (ref 8.6–10)
CHLORIDE SERPL-SCNC: 106 MMOL/L (ref 98–107)
CREAT SERPL-MCNC: 0.94 MG/DL (ref 0.51–0.95)
DEPRECATED HCO3 PLAS-SCNC: 24 MMOL/L (ref 22–29)
EGFRCR SERPLBLD CKD-EPI 2021: 72 ML/MIN/1.73M2
ERYTHROCYTE [DISTWIDTH] IN BLOOD BY AUTOMATED COUNT: 12.8 % (ref 10–15)
GLUCOSE SERPL-MCNC: 88 MG/DL (ref 70–99)
HBA1C MFR BLD: 5.3 % (ref 0–5.6)
HCT VFR BLD AUTO: 41.4 % (ref 35–47)
HGB BLD-MCNC: 13.7 G/DL (ref 11.7–15.7)
MCH RBC QN AUTO: 29.8 PG (ref 26.5–33)
MCHC RBC AUTO-ENTMCNC: 33.1 G/DL (ref 31.5–36.5)
MCV RBC AUTO: 90 FL (ref 78–100)
PLATELET # BLD AUTO: 276 10E3/UL (ref 150–450)
POTASSIUM SERPL-SCNC: 4.4 MMOL/L (ref 3.4–5.3)
PROT SERPL-MCNC: 7.2 G/DL (ref 6.4–8.3)
RBC # BLD AUTO: 4.6 10E6/UL (ref 3.8–5.2)
SODIUM SERPL-SCNC: 140 MMOL/L (ref 136–145)
TSH SERPL DL<=0.005 MIU/L-ACNC: 3.71 UIU/ML (ref 0.3–4.2)
WBC # BLD AUTO: 5.5 10E3/UL (ref 4–11)

## 2023-09-06 PROCEDURE — 85027 COMPLETE CBC AUTOMATED: CPT | Performed by: FAMILY MEDICINE

## 2023-09-06 PROCEDURE — 84443 ASSAY THYROID STIM HORMONE: CPT | Performed by: FAMILY MEDICINE

## 2023-09-06 PROCEDURE — 36415 COLL VENOUS BLD VENIPUNCTURE: CPT | Performed by: FAMILY MEDICINE

## 2023-09-06 PROCEDURE — 99214 OFFICE O/P EST MOD 30 MIN: CPT | Performed by: FAMILY MEDICINE

## 2023-09-06 PROCEDURE — 80053 COMPREHEN METABOLIC PANEL: CPT | Performed by: FAMILY MEDICINE

## 2023-09-06 PROCEDURE — 83036 HEMOGLOBIN GLYCOSYLATED A1C: CPT | Performed by: FAMILY MEDICINE

## 2023-09-06 RX ORDER — AMOXICILLIN 500 MG/1
1000 CAPSULE ORAL 2 TIMES DAILY
Qty: 40 CAPSULE | Refills: 0 | Status: SHIPPED | OUTPATIENT
Start: 2023-09-06 | End: 2024-01-10

## 2023-09-06 ASSESSMENT — PAIN SCALES - GENERAL: PAINLEVEL: NO PAIN (0)

## 2023-09-06 NOTE — PROGRESS NOTES
"  Assessment & Plan     OME (otitis media with effusion), right  We discussed since there is fluid behind the TM and she has had pain in that ear for a while will  treat with Abx   - amoxicillin (AMOXIL) 500 MG capsule; Take 2 capsules (1,000 mg) by mouth 2 times daily    Family history of diabetes mellitus  Will check labs as she is experiencing fatigue   - CBC with platelets; Future  - Hemoglobin A1c; Future    Other fatigue  As above - will check labs today   - CBC with platelets; Future  - TSH with free T4 reflex; Future  - Comprehensive metabolic panel (BMP + Alb, Alk Phos, ALT, AST, Total. Bili, TP); Future       BMI:   Estimated body mass index is 29.7 kg/m  as calculated from the following:    Height as of this encounter: 1.626 m (5' 4\").    Weight as of this encounter: 78.5 kg (173 lb).       RTC if no improving or worsening.  Pt is aware  and comfortable with the current plan.      Kenna Soto MD  [facility]   David is a 53 year old, presenting for the following health issues:  Ear Problem and URI      9/6/2023     9:47 AM   Additional Questions   Roomed by trino PATTERSON at the end of July , covid test was negative and CXR was normal   Now she is still having symptoms, pain in the back , deep breathing catches her breath , feels fatigue   2 days right ear pain, sharp pain, on and off , hearing muffled , no hx of OM cough is better     Ear Problem    URI    History of Present Illness       Reason for visit:  Follow up after my respiratory virus    She eats 2-3 servings of fruits and vegetables daily.She exercises with enough effort to increase her heart rate 9 or less minutes per day.  She exercises with enough effort to increase her heart rate 3 or less days per week.   She is taking medications regularly.       1) had an URI end of July after returning from a trip to [facility] , covid test was negative and normal CXR   She is still experiencing fatigue and some " back ( midback ) pain when walking and taking a deep breath   2) ear pain right ear         Review of Systems   HENT:  Positive for ear pain.       Constitutional, HEENT, cardiovascular, pulmonary, GI, , musculoskeletal, neuro, skin, endocrine and psych systems are negative, except as otherwise noted.      Objective    LMP 12/02/2019   There is no height or weight on file to calculate BMI.  Physical Exam   GENERAL: healthy, alert and no distress  HENT: normal cephalic/atraumatic, right ear: clear effusion and erythematous, nose and mouth without ulcers or lesions, oropharynx clear, and oral mucous membranes moist  NECK: no adenopathy, no asymmetry, masses, or scars and thyroid normal to palpation  RESP: lungs clear to auscultation - no rales, rhonchi or wheezes  CV: regular rate and rhythm, normal S1 S2, no S3 or S4, no murmur, click or rub, no peripheral edema and peripheral pulses strong  ABDOMEN: soft, nontender, no hepatosplenomegaly, no masses and bowel sounds normal  MS: no gross musculoskeletal defects noted, no edema    No results found for this or any previous visit (from the past 24 hour(s)).

## 2024-01-09 PROBLEM — G43.909 MIGRAINE HEADACHE: Status: ACTIVE | Noted: 2024-01-09

## 2024-01-09 RX ORDER — ALBUTEROL SULFATE 90 UG/1
1-2 AEROSOL, METERED RESPIRATORY (INHALATION)
COMMUNITY
Start: 2023-07-26

## 2024-01-09 ASSESSMENT — ENCOUNTER SYMPTOMS
FREQUENCY: 1
DIZZINESS: 1
NERVOUS/ANXIOUS: 1
CHILLS: 0
MYALGIAS: 0
HEADACHES: 1
SHORTNESS OF BREATH: 0
ARTHRALGIAS: 1
ABDOMINAL PAIN: 1
WEAKNESS: 1
EYE PAIN: 0
PARESTHESIAS: 0
NAUSEA: 0
FEVER: 0
PALPITATIONS: 1
SORE THROAT: 0
DIARRHEA: 0
HEMATOCHEZIA: 0
CONSTIPATION: 0
COUGH: 0
DYSURIA: 0
BREAST MASS: 0
JOINT SWELLING: 0
HEARTBURN: 1
HEMATURIA: 0

## 2024-01-09 NOTE — PROGRESS NOTES
David is a 53 year old  female who presents for annual exam.     Besides routine health maintenance, she has no other health concerns today .    HPI:  The patient's PCP is Dr. Kenna Soto MD.      Pt presents for her annual exam. Has not been seen for 3.5 years but is followed by her PCP as above and had labs completed . Her A1C was normal at 5.3, her ALT was high at 66 but o/w normal electrolytes. Had high creat/low GFR in  but those were normal. Her last LDL was  and normal as well    Had not had a mammo in nearly 4 yrs as well but did have one today. No breast changes or complaints    No major changes in health but had some back pain recently, that turned out to be a UTI.  Had pain on lower left back/flank but never really had typical dysuria or frequency/urgency like she's had with UTIs in the past. However very rare to get UTI and hadn't had one in many years. Initially does think her back pain was musculoskeletal b/c it was changed with positions and  worsened with movement or sitting in particular position. However it then seemed to morph to this deeper internal pain, which likely threw her off in wondering what it was and getting eval, until more localized and then found the UTI.   Had lonely a UA that had 21-50 WBC and high spec grav and small blood/bili. No UCx was done however. Was given abx for 10 days since some thought of pyelo and her sx did all resolve. Has had some twinges for a second or two here and there since then where feels like may be starting again but then stops right awy.  This UTI was dx'd on 23.  Is wanting to see if her infection is fully resolved so would like to do a repeat UA/Ucx    Feels like when she passes urine she will still have a feeling of incomplete emptying. Will then stand up, and immediately feels like she needs to go again. Will have just a tiny little bit more urine that she can pass at that point and then feels empty. Not all the  time. Very rare IRMA. Only if very full bladder and then unexpected sneeze or laugh, can leak a little but really overall not a big issue.  Doesn't have any significant OAB or UI/OI either.      Has not completed her C.S yet and knows she is very overdue. When asked why she stated that she keeps hearing within her social [place] bad experiences of waking from anesthesia, pain, bleeding after and so has been scared to do it.      Not sexually active d/t pain with IC and hasn't been for years. Has no libido at all but in addition to that, the last few years when they were still it was really painful  On occasion will talk about it but then really never do anything about it. Wondering today if maybe there's something to help with this     LMP in chart is not accurate  (stated at 12/19) as in 03/19 was still getting infrequent periods, but stopped shortly after last seen in 03/19. Continues to experience daytime hot flashes when she is stressed. Has had nightsweats for years and those have actually gotten a bit better in the last few years but when she had them, they were mild and short lived. Prefers not to do hormones if possible b/c it worries her.      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    Her current contraception method is: menopause.  She  reports that she quit smoking about 23 years ago. Her smoking use included cigarettes. She started smoking about 38 years ago. She has a 15 pack-year smoking history. She has never used smokeless tobacco.    Patient is sexually active.  STD testing offered?  Declined  Last PHQ-9 score on record =       1/10/2024     3:08 PM   PHQ-9 SCORE   PHQ-9 Total Score 5     Last GAD7 score on record =       1/10/2024     3:08 PM   JUAN ALBERTO-7 SCORE   Total Score 6     Alcohol Score = 1    HEALTH MAINTENANCE:  Cholesterol:   Recent Labs   Lab Test 11/01/22  1400  21  1219   CHOL 149 180   HDL 44* 57   LDL 87 96   TRIG 88 136     Last Mammo:   , Result: Normal, Next Mammo: Today   Pap:   Lab Results   Component Value Date    PAP NIL 2018    PAP Negative 2014     Colonoscopy:  NA, Result: Not applicable, Next Colonoscopy: 45 years.  Dexa:  NA    Health maintenance updated:  Yes    Answers submitted by the patient for this visit:  Annual Preventive Visit (Submitted on 2024)  Chief Complaint: Annual Exam:  Frequency of exercise:: 1 day/week  Getting at least 3 servings of Calcium per day:: Yes  Diet:: Regular (no restrictions)  Taking medications regularly:: Not Applicable  Medication side effects:: Not applicable  Bi-annual eye exam:: Yes  Dental care twice a year:: Yes  Sleep apnea or symptoms of sleep apnea:: Daytime drowsiness  abdominal pain: Yes  Blood in stool: No  Blood in urine: No  chest pain: No  chills: No  congestion: No  constipation: No  cough: No  diarrhea: No  dizziness: Yes  ear pain: Yes  eye pain: No  nervous/anxious: Yes  fever: No  frequency: Yes  genital sores: No  headaches: Yes  heartburn: Yes  arthralgias: Yes  joint swelling: No  peripheral edema: No  mood changes: No  myalgias: No  nausea: No  dysuria: No  palpitations: Yes  Skin sensation changes: No  sore throat: No  urgency: No  rash: No  shortness of breath: No  visual disturbance: No  weakness: Yes  pelvic pain: No  vaginal bleeding: No  vaginal discharge: No  tenderness: No  breast mass: No  breast discharge: No  Exercise outside of work (Submitted on 2024)  Chief Complaint: Annual Exam:  Duration of exercise:: Less than 15 minutes    HISTORY:  OB History    Para Term  AB Living   1 1 1 0 0 1   SAB IAB Ectopic Multiple Live Births   0 0 0 0 1      # Outcome Date GA Lbr Raciel/2nd Weight Sex Delivery Anes PTL Lv   1 Term 01 37w0d  2.778 kg (6 lb 2 oz) F    TISH      Name: Analisa       Patient Active Problem List   Diagnosis    Tremor of both  hands    Hot flashes    Chronic idiopathic constipation    Other fatigue    Family history of diabetes mellitus    Abdominal pain, epigastric    Dyspnea on exertion    Headache    Migraine headache    Palpitation    Panic attack    Low libido    Atrophic vaginitis    Dyspareunia due to medical condition in female     Past Surgical History:   Procedure Laterality Date    dilation and curretage  04    UTERINE POLYP REMOVED; PATH: BENIGN POLYP,PROLIFERATIVE ENDOMETRIUM    ENDOMETRIAL SAMPLING (BIOPSY)  9/21/10    SECRETORY ENDOMETRIUM    GYN SURGERY  2006    Removing Fibroid    HYSTEROSCOPY  04    UTERINE POLYP REMOVED    ORTHOPEDIC SURGERY  1975    Broken left arm      Social History     Tobacco Use    Smoking status: Former     Packs/day: 1.00     Years: 15.00     Additional pack years: 0.00     Total pack years: 15.00     Types: Cigarettes     Start date: 1985     Quit date: 2000     Years since quittin.7    Smokeless tobacco: Never   Substance Use Topics    Alcohol use: Yes     Comment: 1x month       Problem (# of Occurrences) Relation (Name,Age of Onset)    Diabetes (1) Paternal Grandmother (David Pemberton)    Hypertension (5) Paternal Grandmother (David Pemberton), Mother (Merry Pemberton), Father (Walter Jacques), Maternal Grandfather (Sherri Hardy), Paternal Grandfather (Iron Jacques)    Osteoporosis (1) Mother (Merry Pemberton)    Other Cancer (3) Father (Walter Jacques): pancreatic cancer, Maternal Grandfather (Sherri Hardy), Maternal Grandmother: cervical    Coronary Artery Disease (1) Paternal Grandmother (David Pemberton)    Uterine Cancer (1) Maternal Grandmother              Current Outpatient Medications   Medication Sig    EPINEPHrine (ANY BX GENERIC EQUIV) 0.3 MG/0.3ML injection 2-pack Inject 0.3 mLs (0.3 mg) into the muscle once as needed for anaphylaxis May repeat one time in 5-15 minutes if response to initial dose is inadequate.    estradiol (ESTRACE) 0.1 MG/GM vaginal cream  "Place 1 g vaginally three times a week    albuterol (PROAIR HFA/PROVENTIL HFA/VENTOLIN HFA) 108 (90 Base) MCG/ACT inhaler Inhale 1-2 puffs into the lungs (Patient not taking: Reported on 1/10/2024)    cetirizine (ZYRTEC) 10 MG tablet Take 10 mg by mouth At Bedtime (Patient not taking: Reported on 1/10/2024)     No current facility-administered medications for this visit.     No Known Allergies    Past medical, surgical, social and family histories were reviewed and updated in EPIC.    EXAM:  /80   Ht 1.613 m (5' 3.5\")   Wt 79.7 kg (175 lb 9.6 oz)   LMP 12/02/2019   Breastfeeding No   BMI 30.62 kg/m     BMI: Body mass index is 30.62 kg/m .    PHYSICAL EXAM:  Constitutional:   Appearance: Well nourished, well developed, alert, in no acute distress  Neck:  Lymph Nodes:  No lymphadenopathy present    Thyroid:  Gland size normal, nontender, no nodules or masses present  on palpation  Chest:  Respiratory Effort:  Breathing unlabored, CTA B  Cardiovascular:    Heart: Auscultation:  Regular rate, normal rhythm, no murmurs present  Breasts: Inspection of Breasts:  No lymphadenopathy present., Palpation of Breasts and Axillae:  No masses present on palpation, no breast tenderness., Axillary Lymph Nodes:  No lymphadenopathy present., and No nodularity, asymmetry or nipple discharge bilaterally.  Gastrointestinal:   Abdominal Examination:  Abdomen nontender to palpation, tone normal without rigidity or guarding, no masses present, umbilicus without lesions   Liver and Spleen:  No hepatomegaly present, liver nontender to palpation    Hernias:  No hernias present  Lymphatic: Lymph Nodes:  No other lymphadenopathy present  Skin:  General Inspection:  No rashes present, no lesions present, no areas of  discoloration  Neurologic:    Mental Status:  Oriented X3.  Normal strength and tone, sensory exam                grossly normal, mentation intact and speech normal.    Psychiatric:   Mentation appears normal and affect " normal/bright.         Pelvic Exam:  External Genitalia:     Normal appearance for age, no discharge present, no tenderness present, no inflammatory lesions present, color normal  Vagina:     Normal vaginal vault without central or paravaginal defects, SMALL VAGINAL INTROITUS BUT NOT STENOTIC, ATROPHIC TISSUES THAT ARE PALE AND SOME RED SPECKLING APPEARANCE, ESPECIALLY PERIURETHRAL C/W ATROPHY, HAD HER VALSALVA AND HAD NEARLY NO CYSTOCELE  Bladder:     Nontender to palpation  Urethra:   Urethral Body:  Urethra palpation normal, urethra structural support normal   Urethral Meatus:  No erythema or lesions present  Cervix:     Appearance healthy, no lesions present, nontender to palpation, no bleeding present  Uterus:     Nontender to palpation, no masses present, position anteflexed, mobility: normal  Adnexa:     No adnexal tenderness present, no adnexal masses present  Perineum:     Perineum within normal limits, no evidence of trauma, no rashes or skin lesions present  Inguinal Lymph Nodes:     No lymphadenopathy present    COUNSELING:   Reviewed preventive health counseling, as reflected in patient instructions  Special attention given to:        Immunizations  Declined: Covid-19, Influenza, TDAP, and Zoster due to Conscientious objector             Colorectal Cancer Screening       (Hilary)menopause management    BMI: Body mass index is 30.62 kg/m .  Weight management plan: Discussed healthy diet and exercise guidelines    ASSESSMENT:  53 year old female with satisfactory annual exam.    ICD-10-CM    1. Encounter for gynecological examination without abnormal finding  Z01.419 Pap thin layer screen with HPV - recommended age 30 - 65 years      2. Dysuria  R30.0 UA with Microscopic - lab collect     Urine Culture Aerobic Bacterial - lab collect     UA with Microscopic - lab collect     Urine Culture Aerobic Bacterial - lab collect     Urine Microscopic Exam      3. Dyspareunia due to medical condition in female   N94.19 estradiol (ESTRACE) 0.1 MG/GM vaginal cream      4. Atrophic vaginitis  N95.2 estradiol (ESTRACE) 0.1 MG/GM vaginal cream      5. Low libido  R68.82       6. Screen for colon cancer  Z12.11 Colonoscopy Screening  Referral     COLOGUARD([employer])          PLAN:    Pap today.  Can follow routine ASCCP guidelines     Mammo today and reiterated the importance of doing an annual mammo and would recommend 3D vs 2D unless finds out it's not covered by insurance.    Discussed c.s screening and that she is very overdue and this is very important  If outright declines c.s then would at least do cologuard and it is recommended every 3 yrs then  Reviewed the limitations in that it is high sensitive for colorectal cancer detection at >94% but it is not detecting precancerous polyps which is the stage you'd want to find so could resect and decrease risk.  Reviewd how incredibly infrequent it is for someone to have inadequate anesthesia, much less bleeding and this would likely be very short lived from irritation or in someone with significant polyp size/burden and resection  However then would want to have found those things to address them, even if some rectal bleeding occurred  Reviewed my own experience to help normalize the procedure for her and encourage her to get it done  Patient stated she would go to [facility], as that is where I had it and had a great experience  However likely lives closer to the [facility] in [facility].  However she did agree to having a referral sent to them and will strongly consider scheduling.  Will place a cologuard order in just in case, as a back up, in case doesn't move forward with c.s      Fasting labs deferred to PCP .  Also due for one or both, Hep C/HIV screening so will do that at the same time.  The patient is showing that she is due for Hepatitis B vaccination.   Would rec she does this with her PCP when has her next indicated labs with her.  Will do antibody testing to  see if showing immunity or not and then based on that will recommend if Hepatitis B vaccination is actually needed or not.  Would rec she does this with her PCP when has her next indicated labs with her.    Should consider dexa scan in the next 1-2 yrs and can offer to her here in the office next year    Additional health issues addressed at today's visit include:    UA/UC today as SHEY  Given her UA was borderline, but abx did resolve her sx, would assume it was a true UTI  However since her UA was obtained today after her exam and given atrophy, will do the UCx as well to ensure no UA contaminants from exam  Reviewed reasons and triggers for UTI and only trigger in her is just progressive atrophic vaginitis  Does not have any cystocele to account for incomplete emptying and urinary retention and her sensation of needing to void after just voiding, but very small amount, is likely just a little urine in urethra    In addition to the urinary issues she has significant atrophy, previous dyspareunia that also contributed to her lack of libido and now is not at all sexually active for quite a while and broached this subject today.    Discussed HRT and the pros/cons/risks  Reviewed the [facility] and updated info  Reviewed just local estrogen for atrophy in terms of estrace and would do that over systemic HRT preferentially as lower risk but also v.x are mild and vaginal sx are more significant  Also recommended silicone based lubricant vs water based and impacts of estrace typically taking 4-6 weeks but also just attempting I.C and working on stretching the vaginal tissues would contribute to improved compliance and comfort with time.  Discussed Estrace 1gram 3x/week and could even increase to 4-5x/week for a couple of weeks to begin with and then could attempt I.C in 4 weeks or so and see how she does  Could always refer to pelvic phys therapy and Rx dilators if felt needed    Reviewed other FDA approved meds for libido and  low efficacy, high cost, etc  Would start with local estrace, consider HRT and/or testosterone before addyi or meds like it  Patient is open to trial of estrace and will contact me if feels she's like to try other interventions      30 minutes in addition to the routine annual preventative exam,  were spent on direct management of the patient's other medical issues as above as well as chart review including: imaging, lab work, previous visit notes by this provider, and other provider notes, as well as chart completion on the same DOS        Becky Galvez MD

## 2024-01-10 ENCOUNTER — ANCILLARY PROCEDURE (OUTPATIENT)
Dept: MAMMOGRAPHY | Facility: CLINIC | Age: 54
End: 2024-01-10
Payer: COMMERCIAL

## 2024-01-10 ENCOUNTER — OFFICE VISIT (OUTPATIENT)
Dept: OBGYN | Facility: CLINIC | Age: 54
End: 2024-01-10
Payer: COMMERCIAL

## 2024-01-10 VITALS
WEIGHT: 175.6 LBS | HEIGHT: 64 IN | DIASTOLIC BLOOD PRESSURE: 80 MMHG | BODY MASS INDEX: 29.98 KG/M2 | SYSTOLIC BLOOD PRESSURE: 120 MMHG

## 2024-01-10 DIAGNOSIS — Z12.11 SCREEN FOR COLON CANCER: ICD-10-CM

## 2024-01-10 DIAGNOSIS — N94.19 DYSPAREUNIA DUE TO MEDICAL CONDITION IN FEMALE: ICD-10-CM

## 2024-01-10 DIAGNOSIS — Z01.419 ENCOUNTER FOR GYNECOLOGICAL EXAMINATION WITHOUT ABNORMAL FINDING: Primary | ICD-10-CM

## 2024-01-10 DIAGNOSIS — N95.2 ATROPHIC VAGINITIS: ICD-10-CM

## 2024-01-10 DIAGNOSIS — R30.0 DYSURIA: ICD-10-CM

## 2024-01-10 DIAGNOSIS — Z12.31 VISIT FOR SCREENING MAMMOGRAM: ICD-10-CM

## 2024-01-10 DIAGNOSIS — R68.82 LOW LIBIDO: ICD-10-CM

## 2024-01-10 LAB
ALBUMIN UR-MCNC: NEGATIVE MG/DL
APPEARANCE UR: CLEAR
BACTERIA #/AREA URNS HPF: ABNORMAL /HPF
BILIRUB UR QL STRIP: NEGATIVE
COLOR UR AUTO: YELLOW
GLUCOSE UR STRIP-MCNC: NEGATIVE MG/DL
HGB UR QL STRIP: NEGATIVE
KETONES UR STRIP-MCNC: NEGATIVE MG/DL
LEUKOCYTE ESTERASE UR QL STRIP: ABNORMAL
NITRATE UR QL: NEGATIVE
PH UR STRIP: 5 [PH] (ref 5–7)
RBC #/AREA URNS AUTO: ABNORMAL /HPF
SP GR UR STRIP: 1.01 (ref 1–1.03)
SQUAMOUS #/AREA URNS AUTO: ABNORMAL /LPF
UROBILINOGEN UR STRIP-ACNC: 0.2 E.U./DL
WBC #/AREA URNS AUTO: ABNORMAL /HPF

## 2024-01-10 PROCEDURE — G0145 SCR C/V CYTO,THINLAYER,RESCR: HCPCS | Performed by: OBSTETRICS & GYNECOLOGY

## 2024-01-10 PROCEDURE — 81001 URINALYSIS AUTO W/SCOPE: CPT | Performed by: OBSTETRICS & GYNECOLOGY

## 2024-01-10 PROCEDURE — 99386 PREV VISIT NEW AGE 40-64: CPT | Performed by: OBSTETRICS & GYNECOLOGY

## 2024-01-10 PROCEDURE — 99214 OFFICE O/P EST MOD 30 MIN: CPT | Mod: 25 | Performed by: OBSTETRICS & GYNECOLOGY

## 2024-01-10 PROCEDURE — 77067 SCR MAMMO BI INCL CAD: CPT | Mod: TC | Performed by: RADIOLOGY

## 2024-01-10 PROCEDURE — 77063 BREAST TOMOSYNTHESIS BI: CPT | Mod: TC | Performed by: RADIOLOGY

## 2024-01-10 PROCEDURE — 87086 URINE CULTURE/COLONY COUNT: CPT | Performed by: OBSTETRICS & GYNECOLOGY

## 2024-01-10 PROCEDURE — 87624 HPV HI-RISK TYP POOLED RSLT: CPT | Performed by: OBSTETRICS & GYNECOLOGY

## 2024-01-10 RX ORDER — ESTRADIOL 0.1 MG/G
1 CREAM VAGINAL
Qty: 42.5 G | Refills: 4 | Status: SHIPPED | OUTPATIENT
Start: 2024-01-10

## 2024-01-10 RX ORDER — CEPHALEXIN 500 MG/1
CAPSULE ORAL
COMMUNITY
Start: 2023-12-20 | End: 2024-01-10

## 2024-01-10 ASSESSMENT — ANXIETY QUESTIONNAIRES
2. NOT BEING ABLE TO STOP OR CONTROL WORRYING: SEVERAL DAYS
7. FEELING AFRAID AS IF SOMETHING AWFUL MIGHT HAPPEN: SEVERAL DAYS
GAD7 TOTAL SCORE: 6
1. FEELING NERVOUS, ANXIOUS, OR ON EDGE: SEVERAL DAYS
GAD7 TOTAL SCORE: 6
5. BEING SO RESTLESS THAT IT IS HARD TO SIT STILL: NOT AT ALL
6. BECOMING EASILY ANNOYED OR IRRITABLE: SEVERAL DAYS
3. WORRYING TOO MUCH ABOUT DIFFERENT THINGS: SEVERAL DAYS
IF YOU CHECKED OFF ANY PROBLEMS ON THIS QUESTIONNAIRE, HOW DIFFICULT HAVE THESE PROBLEMS MADE IT FOR YOU TO DO YOUR WORK, TAKE CARE OF THINGS AT HOME, OR GET ALONG WITH OTHER PEOPLE: SOMEWHAT DIFFICULT

## 2024-01-10 ASSESSMENT — PATIENT HEALTH QUESTIONNAIRE - PHQ9
5. POOR APPETITE OR OVEREATING: SEVERAL DAYS
SUM OF ALL RESPONSES TO PHQ QUESTIONS 1-9: 5

## 2024-01-11 ENCOUNTER — ORDERS ONLY (AUTO-RELEASED) (OUTPATIENT)
Dept: OBGYN | Facility: CLINIC | Age: 54
End: 2024-01-11
Payer: COMMERCIAL

## 2024-01-11 DIAGNOSIS — Z12.11 SCREEN FOR COLON CANCER: ICD-10-CM

## 2024-01-11 PROBLEM — N95.2 ATROPHIC VAGINITIS: Status: ACTIVE | Noted: 2024-01-11

## 2024-01-11 PROBLEM — R68.82 LOW LIBIDO: Status: ACTIVE | Noted: 2024-01-11

## 2024-01-11 PROBLEM — N94.19 DYSPAREUNIA DUE TO MEDICAL CONDITION IN FEMALE: Status: ACTIVE | Noted: 2024-01-11

## 2024-01-12 LAB — BACTERIA UR CULT: NORMAL

## 2024-01-15 LAB
BKR LAB AP GYN ADEQUACY: NORMAL
BKR LAB AP GYN INTERPRETATION: NORMAL
BKR LAB AP HPV REFLEX: NORMAL
BKR LAB AP PREVIOUS ABNORMAL: NORMAL
PATH REPORT.COMMENTS IMP SPEC: NORMAL
PATH REPORT.COMMENTS IMP SPEC: NORMAL
PATH REPORT.RELEVANT HX SPEC: NORMAL

## 2024-01-16 LAB
HUMAN PAPILLOMA VIRUS 16 DNA: NEGATIVE
HUMAN PAPILLOMA VIRUS 18 DNA: NEGATIVE
HUMAN PAPILLOMA VIRUS FINAL DIAGNOSIS: NORMAL
HUMAN PAPILLOMA VIRUS OTHER HR: NEGATIVE

## 2024-04-04 ENCOUNTER — MYC MEDICAL ADVICE (OUTPATIENT)
Dept: FAMILY MEDICINE | Facility: CLINIC | Age: 54
End: 2024-04-04
Payer: COMMERCIAL

## 2024-04-09 ENCOUNTER — OFFICE VISIT (OUTPATIENT)
Dept: FAMILY MEDICINE | Facility: CLINIC | Age: 54
End: 2024-04-09
Payer: COMMERCIAL

## 2024-04-09 VITALS
BODY MASS INDEX: 30.01 KG/M2 | SYSTOLIC BLOOD PRESSURE: 109 MMHG | RESPIRATION RATE: 16 BRPM | TEMPERATURE: 97.3 F | DIASTOLIC BLOOD PRESSURE: 73 MMHG | WEIGHT: 175.8 LBS | HEART RATE: 69 BPM | HEIGHT: 64 IN | OXYGEN SATURATION: 96 %

## 2024-04-09 DIAGNOSIS — R53.83 OTHER FATIGUE: Primary | ICD-10-CM

## 2024-04-09 DIAGNOSIS — R31.21 ASYMPTOMATIC MICROSCOPIC HEMATURIA: ICD-10-CM

## 2024-04-09 DIAGNOSIS — Z87.440 PERSONAL HISTORY OF URINARY TRACT INFECTION: ICD-10-CM

## 2024-04-09 DIAGNOSIS — Z83.3 FAMILY HISTORY OF DIABETES MELLITUS: ICD-10-CM

## 2024-04-09 DIAGNOSIS — R79.89 ELEVATED SERUM CREATININE: ICD-10-CM

## 2024-04-09 DIAGNOSIS — M54.50 ACUTE BILATERAL LOW BACK PAIN WITHOUT SCIATICA: ICD-10-CM

## 2024-04-09 LAB
ALBUMIN SERPL BCG-MCNC: 4.3 G/DL (ref 3.5–5.2)
ALBUMIN UR-MCNC: NEGATIVE MG/DL
ALP SERPL-CCNC: 74 U/L (ref 40–150)
ALT SERPL W P-5'-P-CCNC: 23 U/L (ref 0–50)
ANION GAP SERPL CALCULATED.3IONS-SCNC: 12 MMOL/L (ref 7–15)
APPEARANCE UR: CLEAR
AST SERPL W P-5'-P-CCNC: 23 U/L (ref 0–45)
BACTERIA #/AREA URNS HPF: ABNORMAL /HPF
BILIRUB SERPL-MCNC: 0.6 MG/DL
BILIRUB UR QL STRIP: NEGATIVE
BUN SERPL-MCNC: 12.8 MG/DL (ref 6–20)
CALCIUM SERPL-MCNC: 9.2 MG/DL (ref 8.6–10)
CHLORIDE SERPL-SCNC: 107 MMOL/L (ref 98–107)
CHOLEST SERPL-MCNC: 178 MG/DL
COLOR UR AUTO: YELLOW
CREAT SERPL-MCNC: 1.01 MG/DL (ref 0.51–0.95)
DEPRECATED HCO3 PLAS-SCNC: 22 MMOL/L (ref 22–29)
EGFRCR SERPLBLD CKD-EPI 2021: 66 ML/MIN/1.73M2
ERYTHROCYTE [DISTWIDTH] IN BLOOD BY AUTOMATED COUNT: 12.2 % (ref 10–15)
FASTING STATUS PATIENT QL REPORTED: YES
GLUCOSE SERPL-MCNC: 94 MG/DL (ref 70–99)
GLUCOSE UR STRIP-MCNC: NEGATIVE MG/DL
HBA1C MFR BLD: 5.3 % (ref 0–5.6)
HCT VFR BLD AUTO: 43.7 % (ref 35–47)
HDLC SERPL-MCNC: 55 MG/DL
HGB BLD-MCNC: 14.4 G/DL (ref 11.7–15.7)
HGB UR QL STRIP: ABNORMAL
INSULIN SERPL-ACNC: 7.3 UU/ML (ref 2.6–24.9)
KETONES UR STRIP-MCNC: NEGATIVE MG/DL
LDLC SERPL CALC-MCNC: 99 MG/DL
LEUKOCYTE ESTERASE UR QL STRIP: NEGATIVE
MCH RBC QN AUTO: 29.7 PG (ref 26.5–33)
MCHC RBC AUTO-ENTMCNC: 33 G/DL (ref 31.5–36.5)
MCV RBC AUTO: 90 FL (ref 78–100)
NITRATE UR QL: NEGATIVE
NONHDLC SERPL-MCNC: 123 MG/DL
PH UR STRIP: 5.5 [PH] (ref 5–7)
PLATELET # BLD AUTO: 282 10E3/UL (ref 150–450)
POTASSIUM SERPL-SCNC: 3.9 MMOL/L (ref 3.4–5.3)
PROT SERPL-MCNC: 7.2 G/DL (ref 6.4–8.3)
RBC # BLD AUTO: 4.85 10E6/UL (ref 3.8–5.2)
RBC #/AREA URNS AUTO: ABNORMAL /HPF
SODIUM SERPL-SCNC: 141 MMOL/L (ref 135–145)
SP GR UR STRIP: >=1.03 (ref 1–1.03)
TRIGL SERPL-MCNC: 122 MG/DL
TSH SERPL DL<=0.005 MIU/L-ACNC: 4.17 UIU/ML (ref 0.3–4.2)
UROBILINOGEN UR STRIP-ACNC: 0.2 E.U./DL
WBC # BLD AUTO: 5.7 10E3/UL (ref 4–11)
WBC #/AREA URNS AUTO: ABNORMAL /HPF

## 2024-04-09 PROCEDURE — 36415 COLL VENOUS BLD VENIPUNCTURE: CPT | Performed by: FAMILY MEDICINE

## 2024-04-09 PROCEDURE — 81001 URINALYSIS AUTO W/SCOPE: CPT | Performed by: FAMILY MEDICINE

## 2024-04-09 PROCEDURE — 83525 ASSAY OF INSULIN: CPT | Performed by: FAMILY MEDICINE

## 2024-04-09 PROCEDURE — 80061 LIPID PANEL: CPT | Performed by: FAMILY MEDICINE

## 2024-04-09 PROCEDURE — 83036 HEMOGLOBIN GLYCOSYLATED A1C: CPT | Performed by: FAMILY MEDICINE

## 2024-04-09 PROCEDURE — 99214 OFFICE O/P EST MOD 30 MIN: CPT | Performed by: FAMILY MEDICINE

## 2024-04-09 PROCEDURE — 80053 COMPREHEN METABOLIC PANEL: CPT | Performed by: FAMILY MEDICINE

## 2024-04-09 PROCEDURE — 84443 ASSAY THYROID STIM HORMONE: CPT | Performed by: FAMILY MEDICINE

## 2024-04-09 PROCEDURE — 85027 COMPLETE CBC AUTOMATED: CPT | Performed by: FAMILY MEDICINE

## 2024-04-09 ASSESSMENT — ANXIETY QUESTIONNAIRES
GAD7 TOTAL SCORE: 6
7. FEELING AFRAID AS IF SOMETHING AWFUL MIGHT HAPPEN: SEVERAL DAYS
1. FEELING NERVOUS, ANXIOUS, OR ON EDGE: SEVERAL DAYS
6. BECOMING EASILY ANNOYED OR IRRITABLE: SEVERAL DAYS
7. FEELING AFRAID AS IF SOMETHING AWFUL MIGHT HAPPEN: SEVERAL DAYS
IF YOU CHECKED OFF ANY PROBLEMS ON THIS QUESTIONNAIRE, HOW DIFFICULT HAVE THESE PROBLEMS MADE IT FOR YOU TO DO YOUR WORK, TAKE CARE OF THINGS AT HOME, OR GET ALONG WITH OTHER PEOPLE: NOT DIFFICULT AT ALL
GAD7 TOTAL SCORE: 6
GAD7 TOTAL SCORE: 6
5. BEING SO RESTLESS THAT IT IS HARD TO SIT STILL: NOT AT ALL
2. NOT BEING ABLE TO STOP OR CONTROL WORRYING: SEVERAL DAYS
8. IF YOU CHECKED OFF ANY PROBLEMS, HOW DIFFICULT HAVE THESE MADE IT FOR YOU TO DO YOUR WORK, TAKE CARE OF THINGS AT HOME, OR GET ALONG WITH OTHER PEOPLE?: NOT DIFFICULT AT ALL
4. TROUBLE RELAXING: SEVERAL DAYS
3. WORRYING TOO MUCH ABOUT DIFFERENT THINGS: SEVERAL DAYS

## 2024-04-09 ASSESSMENT — PATIENT HEALTH QUESTIONNAIRE - PHQ9
SUM OF ALL RESPONSES TO PHQ QUESTIONS 1-9: 6
10. IF YOU CHECKED OFF ANY PROBLEMS, HOW DIFFICULT HAVE THESE PROBLEMS MADE IT FOR YOU TO DO YOUR WORK, TAKE CARE OF THINGS AT HOME, OR GET ALONG WITH OTHER PEOPLE: NOT DIFFICULT AT ALL
SUM OF ALL RESPONSES TO PHQ QUESTIONS 1-9: 6

## 2024-04-09 ASSESSMENT — PAIN SCALES - GENERAL: PAINLEVEL: NO PAIN (0)

## 2024-04-09 NOTE — PROGRESS NOTES
Assessment & Plan     Other fatigue  Especially pronounced after eating sweets , we discussed checking labs as below , Hgb A1 c and also insulin levels   Pt is also fasting for lipids check as well   - REVIEW OF HEALTH MAINTENANCE PROTOCOL ORDERS  - Comprehensive metabolic panel (BMP + Alb, Alk Phos, ALT, AST, Total. Bili, TP); Future  - Lipid panel reflex to direct LDL Fasting; Future  - TSH with free T4 reflex; Future  - CBC with platelets; Future  - Comprehensive metabolic panel (BMP + Alb, Alk Phos, ALT, AST, Total. Bili, TP)  - Lipid panel reflex to direct LDL Fasting  - TSH with free T4 reflex  - CBC with platelets    Personal history of urinary tract infection  Will check as she has had a UTI back in December which was treated   - UA with Microscopic reflex to Culture - lab collect; Future  - UA with Microscopic reflex to Culture - lab collect  - UA Microscopic with Reflex to Culture    Family history of diabetes mellitus  Will screen   - Hemoglobin A1c; Future  - Insulin level; Future  - Hemoglobin A1c  - Insulin level    Acute bilateral low back pain without sciatica  Discussed doing some PT , if symptoms worse or do not improve , would need to follow up ,no red flags on exam   - Physical Therapy  Referral; Future        Subjective   David is a 53 year old, presenting for the following health issues:  Abdominal Pain      4/9/2024     6:54 AM   Additional Questions   Roomed by Lindsay HUNT   Accompanied by n/a     History of Present Illness       Reason for visit:  Feeling very tired after eating sweets, heartburn, need to lay down  Symptom onset:  3-7 days ago  Symptoms include:  Feeling very tired after eating sweets, heartburn, need to lay down  Symptom intensity:  Moderate  Symptom progression:  Worsening  Had these symptoms before:  No  What makes it worse:  Eating ice cream  What makes it better:  I stopped eating sweets, but still feel tired.    She eats 2-3 servings of fruits and vegetables  daily.She consumes 0 sweetened beverage(s) daily.She exercises with enough effort to increase her heart rate 10 to 19 minutes per day.  She exercises with enough effort to increase her heart rate 3 or less days per week.   She is taking medications regularly.   Felt abd pain and diarrhea after eating ice cream a few weeks ago , stopped eating ice cream ,   Strong fatigue , weakness ,   Feels after eating something sweet very fatigue , feels weak , FH of DM type 2 MGM , menopausal for the past 3 to 4 yrs   2) right lower back pain sudden sharp , hx of this in the past went to  during December same thing UTI         Review of Systems  Constitutional, HEENT, cardiovascular, pulmonary, GI, , musculoskeletal, neuro, skin, endocrine and psych systems are negative, except as otherwise noted.      Objective    LMP 12/02/2019   There is no height or weight on file to calculate BMI.  Physical Exam   GENERAL: alert and no distress  EYES: Eyes grossly normal to inspection, PERRL and conjunctivae and sclerae normal  NECK: no adenopathy, no asymmetry, masses, or scars  RESP: lungs clear to auscultation - no rales, rhonchi or wheezes  CV: regular rate and rhythm, normal S1 S2, no S3 or S4, no murmur, click or rub, no peripheral edema  ABDOMEN: soft, nontender, no hepatosplenomegaly, no masses and bowel sounds normal  MS: no gross musculoskeletal defects noted, no edema  NEURO: Normal strength and tone, mentation intact and speech normal  PSYCH: mentation appears normal, affect normal/bright    Results for orders placed or performed in visit on 04/09/24 (from the past 24 hour(s))   Hemoglobin A1c   Result Value Ref Range    Hemoglobin A1C 5.3 0.0 - 5.6 %   UA with Microscopic reflex to Culture - lab collect    Specimen: Urine, Midstream   Result Value Ref Range    Color Urine Yellow Colorless, Straw, Light Yellow, Yellow    Appearance Urine Clear Clear    Glucose Urine Negative Negative mg/dL    Bilirubin Urine Negative  Negative    Ketones Urine Negative Negative mg/dL    Specific Gravity Urine >=1.030 1.003 - 1.035    Blood Urine Trace (A) Negative    pH Urine 5.5 5.0 - 7.0    Protein Albumin Urine Negative Negative mg/dL    Urobilinogen Urine 0.2 0.2, 1.0 E.U./dL    Nitrite Urine Negative Negative    Leukocyte Esterase Urine Negative Negative   CBC with platelets   Result Value Ref Range    WBC Count 5.7 4.0 - 11.0 10e3/uL    RBC Count 4.85 3.80 - 5.20 10e6/uL    Hemoglobin 14.4 11.7 - 15.7 g/dL    Hematocrit 43.7 35.0 - 47.0 %    MCV 90 78 - 100 fL    MCH 29.7 26.5 - 33.0 pg    MCHC 33.0 31.5 - 36.5 g/dL    RDW 12.2 10.0 - 15.0 %    Platelet Count 282 150 - 450 10e3/uL   UA Microscopic with Reflex to Culture   Result Value Ref Range    Bacteria Urine Few (A) None Seen /HPF    RBC Urine 2-5 (A) 0-2 /HPF /HPF    WBC Urine 0-5 0-5 /HPF /HPF    Narrative    Urine Culture not indicated           Signed Electronically by: Kenna Soto MD

## 2024-04-15 ENCOUNTER — THERAPY VISIT (OUTPATIENT)
Dept: PHYSICAL THERAPY | Facility: CLINIC | Age: 54
End: 2024-04-15
Attending: FAMILY MEDICINE
Payer: COMMERCIAL

## 2024-04-15 DIAGNOSIS — M54.50 ACUTE RIGHT-SIDED LOW BACK PAIN WITHOUT SCIATICA: Primary | ICD-10-CM

## 2024-04-15 DIAGNOSIS — M54.50 ACUTE BILATERAL LOW BACK PAIN WITHOUT SCIATICA: ICD-10-CM

## 2024-04-15 PROCEDURE — 97161 PT EVAL LOW COMPLEX 20 MIN: CPT | Mod: GP | Performed by: PHYSICAL THERAPIST

## 2024-04-15 PROCEDURE — 97110 THERAPEUTIC EXERCISES: CPT | Mod: GP | Performed by: PHYSICAL THERAPIST

## 2024-04-15 PROCEDURE — 97140 MANUAL THERAPY 1/> REGIONS: CPT | Mod: GP | Performed by: PHYSICAL THERAPIST

## 2024-04-15 NOTE — PROGRESS NOTES
PHYSICAL THERAPY EVALUATION  Type of Visit: Evaluation  Patient reports onset of L LBP 12/15/2023 after she had cleaned her aquarium.  Pain resolved after 3 days.  She reports R LBP episode on 04/05/2024 when she was lifting her R leg up to put her pants on.  This R LBP was not as severe as the L LBP she had in December.  Pain is located in R LB/superior buttock and wraps into the R abdomen.    See electronic medical record for Abuse and Falls Screening details.    Subjective       Presenting condition or subjective complaint: severe back pain that occurs once in a while  Date of onset: 04/05/24    Relevant medical history: Menopause; Migraines or headaches   Past Medical History:   Diagnosis Date    Atrophic vaginitis 01/11/2024    Chronic idiopathic constipation 12/02/2016    DUB (dysfunctional uterine bleeding) 2010    Dyspareunia due to medical condition in female 01/11/2024    Hot flashes 12/02/2016    Leiomyoma of uterus 02/26/2009    POSTERIOR FIBROID 1.5 X 1.1 X 0.8 CM    RhD negative     Tremor of both hands 12/02/2016    Uterine polyp 08/02/2004    HAD A HYSTEROSCOPY , D AND C FOR A UTERINE POLYP       Dates & types of surgery:    Past Surgical History:   Procedure Laterality Date    dilation and curretage  8/2/04    UTERINE POLYP REMOVED; PATH: BENIGN POLYP,PROLIFERATIVE ENDOMETRIUM    ENDOMETRIAL SAMPLING (BIOPSY)  9/21/10    SECRETORY ENDOMETRIUM    GYN SURGERY  2006    Removing Fibroid    HYSTEROSCOPY  8/2/04    UTERINE POLYP REMOVED    ORTHOPEDIC SURGERY  1975    Broken left arm     Current Outpatient Medications   Medication Sig Dispense Refill    albuterol (PROAIR HFA/PROVENTIL HFA/VENTOLIN HFA) 108 (90 Base) MCG/ACT inhaler Inhale 1-2 puffs into the lungs (Patient not taking: Reported on 1/10/2024)      cetirizine (ZYRTEC) 10 MG tablet Take 10 mg by mouth At Bedtime (Patient not taking: Reported on 1/10/2024)      EPINEPHrine (ANY BX GENERIC EQUIV) 0.3 MG/0.3ML injection 2-pack Inject 0.3 mLs (0.3  mg) into the muscle once as needed for anaphylaxis May repeat one time in 5-15 minutes if response to initial dose is inadequate. 2 each 0    estradiol (ESTRACE) 0.1 MG/GM vaginal cream Place 1 g vaginally three times a week 42.5 g 4     No current facility-administered medications for this visit.         Prior diagnostic imaging/testing results: Other no   Prior therapy history for the same diagnosis, illness or injury: No      Prior Level of Function  Transfers: Independent  Ambulation: Independent  ADL: Independent  IADL:     Living Environment  Social support: With a significant other or spouse   Type of home: House   Stairs to enter the home: No       Ramp: No   Stairs inside the home: Yes 28 Is there a railing: Yes   Help at home: None  Equipment owned:       Employment: Yes [employer]   Hobbies/Interests: walk, read, music, dance    Patient goals for therapy: I would like to strengthen my muscles, so the pain never returns    Pain assessment: Location: R LB/superior buttock, R abdomen/Ratin/10     Objective   LUMBAR SPINE EVALUATION  PAIN:   INTEGUMENTARY (edema, incisions):   POSTURE:   GAIT:   Weightbearing Status:   Assistive Device(s):   Gait Deviations:   BALANCE/PROPRIOCEPTION:   WEIGHTBEARING ALIGNMENT:   NON-WEIGHTBEARING ALIGNMENT:    ROM: flexion nil loss, p. Mild R LBP; extension mod loss, NE; L SG nil loss, NE; R SG min loss, NE  PELVIC/SI SCREEN:   STRENGTH:   Work mechanical stresses:  sitting many hours, sometimes stands but doesn't do this as much  Leisure mechanical stresses: sometimes walking but mostly sedentary  Functional disability score (CANDI/STarT Back):    VAS score (0-10): 1/10      ADDITIONAL HISTORY:  Present symptoms: R LB/superior buttock, wraps around to the R abdomen area  Pain quality: Aching and Sharp  Paresthesia (yes/no):  no  Symptoms (improving/unchanging/worsening):  improving.   Symptoms commenced as a result of: lifting R leg up to put pants on while  "standing   Condition occurred in the following environment:   home     Symptoms at onset (back/thigh/leg): R LB/superior buttock pain, wraps around to R abdomen  Constant symptoms (back/thigh/leg): none  Intermittent symptoms (back/thigh/leg): R LB/superior buttock, R abdomen    Symptoms are made worse with the following: sitting 1 hour, rolling/moving in bed--wakes her up,  losing 2 hours/night sleep  Symptoms are made better with the following: avoiding aggravating factors    Disturbed sleep (yes/no):  yes Sleeping postures (prone/sup/side R/L): sides    Previous episodes (0/1-5/6-10/11+): 1 Year of first episode: 2023--December    Previous history: 1 episode of L LBP in December 2023--self-resolving with rest  Previous treatments: none    Specific Questions:  Cough/Sneeze/Strain (pos/neg): neg  Bowel/Bladder (normal/abnormal): normal  Gait (normal/abnormal): normal  Medications (nil/NSAIDS/analg/steroids/anticoag/other):  see above  Medical allergies:  see chart  General health (excellent/good/fair/poor):  good  Pertinent medical history:  see above  Imaging (None/Xray/MRI/Other):  none  Recent or major surgery (yes/no):  no  Night pain (yes/no): no  Accidents (yes/no): no  Unexplained weight loss (yes/no): no  Barriers at home: no  Other red flags: no    Postural Observation:   Sitting: Kyphotic  Change of posture: No effect, no pain in sitting to start with, \"feels more supported\"  Standing: Neutral  Lateral Shift: Nil.  Other Observations: none    Neurological:  Motor Deficit:  Not assessed--no radicular complaints     Reflexes:  Not assessed--no radicular complaints    Sensory Deficit:  Not assessed--no radicular complaints     Neurodynamic tests:  Not assessed--no radicular complaints      Test Movements:   During: produces, abolishes, increases, decreases, no effect, centralizing, peripheralizing   After: better, worse, no better, no worse, no effect, centralized, peripheralized    Symptomatic response " Mechanical response    During testing After testing Effect - increased ROM, decreased ROM, or key functional test No Effect   Pretest symptoms standing:      Rep FIS       Rep EIS         Pretest symptoms lying: prone lying, no LBP    During testing After testing Effect - increased ROM, decreased ROM, or key functional test No Effect   Rep TALIA       Rep EIL Decreases    W/self-OP--decreases    Better    BETTER    Improved ext, decr R SG pain  Improved ext, further decrease R SG pain      If required, pretest symptoms:    During testing After testing Effect - increased ROM, decreased ROM, or key functional test No Effect   Rep SGIS - R       Rep SGIS - L         Static Tests:      Provisional Classification: Derangement - Asymmetrical, unilateral, symptoms above knee    Potential Drivers of Pain and/or Disability: none    Principle of Management:  Education:  Posture--use of lumbar roll in sitting and importance; POC, treatment rationale, expected response   Equipment provided:  none  Mechanical therapy (Y/N):  y   Extension principle:  REIL x10 reps, last 3 reps w/self-OP, every 2 hours  Lateral Principle:    Flexion principle:    Other:    MYOTOMES:   DTR S:   CORD SIGNS:   DERMATOMES:   NEURAL TENSION:   FLEXIBILITY:   LUMBAR/HIP Special Tests:    PELVIS/SI SPECIAL TESTS:   FUNCTIONAL TESTS:   PALPATION:   SPINAL SEGMENTAL CONCLUSIONS:       Assessment & Plan   CLINICAL IMPRESSIONS  Medical Diagnosis: Acute bilateral low back pain without sciatica    Treatment Diagnosis: R LBP   Impression/Assessment: Patient is a 53 year old female with R LB complaints.  The following significant findings have been identified: Pain, Decreased ROM/flexibility, Impaired gait, and Impaired muscle performance. These impairments interfere with their ability to perform self care tasks and work tasks as compared to previous level of function.     Clinical Decision Making (Complexity):  Clinical Presentation:  Stable/Uncomplicated  Clinical Presentation Rationale: based on medical and personal factors listed in PT evaluation  Clinical Decision Making (Complexity): Low complexity    PLAN OF CARE  Treatment Interventions:  Interventions: Manual Therapy, Neuromuscular Re-education, Therapeutic Activity, Therapeutic Exercise, Self-Care/Home Management    Long Term Goals     PT Goal 1  Goal Identifier: sitting  Goal Description: Patient will be able to sit for unlimited amount of time without LBP  Rationale:  (for work activities, rest from standing, transportation)  Goal Progress: Currently 1/10 pain with sitting 1 hour  Target Date: 05/27/24      Frequency of Treatment: 1x/week  Duration of Treatment: 6 weeks    Recommended Referrals to Other Professionals: none  Education Assessment:   Learner/Method: Patient;Listening;Reading;Demonstration;Pictures/Video;No Barriers to Learning    Risks and benefits of evaluation/treatment have been explained.   Patient/Family/caregiver agrees with Plan of Care.     Evaluation Time:     PT Eval, Low Complexity Minutes (72877): 17       Signing Clinician: Idalia Jones PT

## 2024-04-24 ENCOUNTER — THERAPY VISIT (OUTPATIENT)
Dept: PHYSICAL THERAPY | Facility: CLINIC | Age: 54
End: 2024-04-24
Attending: FAMILY MEDICINE
Payer: COMMERCIAL

## 2024-04-24 DIAGNOSIS — M54.50 ACUTE RIGHT-SIDED LOW BACK PAIN WITHOUT SCIATICA: ICD-10-CM

## 2024-04-24 DIAGNOSIS — M54.50 ACUTE BILATERAL LOW BACK PAIN WITHOUT SCIATICA: Primary | ICD-10-CM

## 2024-04-24 PROCEDURE — 97140 MANUAL THERAPY 1/> REGIONS: CPT | Mod: GP | Performed by: PHYSICAL THERAPIST

## 2024-04-24 PROCEDURE — 97110 THERAPEUTIC EXERCISES: CPT | Mod: GP | Performed by: PHYSICAL THERAPIST

## 2024-04-26 ENCOUNTER — TRANSFERRED RECORDS (OUTPATIENT)
Dept: HEALTH INFORMATION MANAGEMENT | Facility: CLINIC | Age: 54
End: 2024-04-26
Payer: COMMERCIAL

## 2024-06-27 PROBLEM — M54.50 ACUTE RIGHT-SIDED LOW BACK PAIN WITHOUT SCIATICA: Status: RESOLVED | Noted: 2024-04-15 | Resolved: 2024-06-27

## 2024-06-27 PROBLEM — M54.50 ACUTE BILATERAL LOW BACK PAIN WITHOUT SCIATICA: Status: RESOLVED | Noted: 2024-04-15 | Resolved: 2024-06-27

## 2024-06-27 NOTE — PROGRESS NOTES
DISCHARGE  Reason for Discharge: Patient has failed to schedule further appointments.    Equipment Issued: none    Discharge Plan: Patient to continue home program.    Referring Provider:  Kenna Soto     04/24/24 0500   Appointment Info   Signing clinician's name / credentials Idalia Jones, PT   Total/Authorized Visits 6 (PT estimate)   Visits Used 2   Medical Diagnosis Acute bilateral low back pain without sciatica   PT Tx Diagnosis R LBP   Progress Note/Certification   Onset of illness/injury or Date of Surgery 04/05/24   Therapy Frequency 1x/week   Predicted Duration 6 weeks   Progress Note Due Date 05/27/24   Progress Note Completed Date 04/15/24   GOALS   PT Goals 2   PT Goal 1   Goal Identifier sitting   Goal Description Patient will be able to sit for unlimited amount of time without LBP   Rationale   (for work activities, rest from standing, transportation)   Goal Progress currently 1/10 pain at times with 2 hours or more sitting.  Continue.   Target Date 05/27/24   Subjective Report   Subjective Report Patient reports her LB is better.  She does not have pain or stiffness with getting up after prolonged sitting like she did previously.  She notices only mild LBP if she sits 2 hours or more and this is not always.   Objective Measures   Objective Measures Objective Measure 1   Objective Measure 1   Objective Measure Lumbar AROM:   Details flexion nil loss, NE: extension min loss, p. mild R LBP; R SG min loss, p. mild R LBP   Treatment Interventions (PT)   Interventions Therapeutic Procedure/Exercise;Manual Therapy;Neuromuscular Re-education;Therapeutic Activity   Therapeutic Procedure/Exercise   Therapeutic Procedures: strength, endurance, ROM, flexibility minutes (92534) 23   Therapeutic Procedures Ther Proc 2;Ther Proc 3;Ther Proc 4   Ther Proc 2 REIL w/self-OP   Ther Proc 2 - Details x10 reps, 3 sets--good technique--VC for end range   Skilled Intervention VC for end range   Patient Response/Progress  decreases during, BETTER after, decreased pain R SG, improved extension ROM   Ther Proc 3 bridge   Ther Proc 4 abdom ex   Ther Proc 4 - Details #4 w/leg drop x15 reps B--VC, MC for neutral spine, lindsay abs without tilting pelvis posteriorly   Ther Proc 3 - Details #1 w/DF x25 reps--VC, MC for neutral spine, not posteriorly tilting pelvis   Therapeutic Activity   Therapeutic Activities: dynamic activities to improve functional performance minutes (86790) 4   Ther Act 1 x4'   Ther Act 1 - Details importance of continued consistency with REIL exercises until symptoms resolved for 1 week and ROM full and painfree in all directions; use of REIL exercises before and after potentially aggravating activities--i.e. yardwork and household projects   Skilled Intervention education   Patient Response/Progress good understanding   Neuromuscular Re-education   Neuromuscular re-ed of mvmt, balance, coord, kinesthetic sense, posture, proprioception minutes (15701) 1   Neuro Re-ed 1 x1'--review of posture and importance, impact on sit to stand transitions   Skilled Intervention education   Patient Response/Progress good understanding and compliance with   Manual Therapy   Manual Therapy: Mobilization, MFR, MLD, friction massage minutes (42477) 8   Manual Therapy 1 prone, T10-L5 ext mobs   Manual Therapy 1 - Details gr I-III x8 min   Skilled Intervention hands-on technique   Patient Response/Progress decreased pain extension, improved extension ROM   Education   Learner/Method Patient;Listening;Reading;Demonstration;Pictures/Video;No Barriers to Learning   Plan   Plan for next session continue extension exercises, progress core strength as able   Total Session Time   Timed Code Treatment Minutes 36   Total Treatment Time (sum of timed and untimed services) 36

## 2024-11-26 ENCOUNTER — OFFICE VISIT (OUTPATIENT)
Dept: FAMILY MEDICINE | Facility: CLINIC | Age: 54
End: 2024-11-26
Payer: COMMERCIAL

## 2024-11-26 VITALS
BODY MASS INDEX: 29.33 KG/M2 | TEMPERATURE: 97.1 F | WEIGHT: 171.8 LBS | OXYGEN SATURATION: 96 % | RESPIRATION RATE: 18 BRPM | DIASTOLIC BLOOD PRESSURE: 79 MMHG | HEART RATE: 63 BPM | SYSTOLIC BLOOD PRESSURE: 115 MMHG | HEIGHT: 64 IN

## 2024-11-26 DIAGNOSIS — R53.83 FATIGUE, UNSPECIFIED TYPE: Primary | ICD-10-CM

## 2024-11-26 DIAGNOSIS — M79.672 PAIN IN BOTH FEET: ICD-10-CM

## 2024-11-26 DIAGNOSIS — M79.671 PAIN IN BOTH FEET: ICD-10-CM

## 2024-11-26 DIAGNOSIS — R73.01 ELEVATED FASTING GLUCOSE: ICD-10-CM

## 2024-11-26 DIAGNOSIS — R30.0 DYSURIA: ICD-10-CM

## 2024-11-26 LAB
ALBUMIN UR-MCNC: NEGATIVE MG/DL
APPEARANCE UR: CLEAR
B BURGDOR IGG+IGM SER QL: 0.1
BACTERIA #/AREA URNS HPF: ABNORMAL /HPF
BILIRUB UR QL STRIP: NEGATIVE
COLOR UR AUTO: YELLOW
ERYTHROCYTE [SEDIMENTATION RATE] IN BLOOD BY WESTERGREN METHOD: 9 MM/HR (ref 0–30)
EST. AVERAGE GLUCOSE BLD GHB EST-MCNC: 103 MG/DL
GLUCOSE UR STRIP-MCNC: NEGATIVE MG/DL
HBA1C MFR BLD: 5.2 % (ref 0–5.6)
HGB UR QL STRIP: NEGATIVE
KETONES UR STRIP-MCNC: NEGATIVE MG/DL
LEUKOCYTE ESTERASE UR QL STRIP: ABNORMAL
NITRATE UR QL: NEGATIVE
PH UR STRIP: 6.5 [PH] (ref 5–7)
RBC #/AREA URNS AUTO: ABNORMAL /HPF
SP GR UR STRIP: 1.02 (ref 1–1.03)
SQUAMOUS #/AREA URNS AUTO: ABNORMAL /LPF
UROBILINOGEN UR STRIP-ACNC: 0.2 E.U./DL
WBC #/AREA URNS AUTO: ABNORMAL /HPF

## 2024-11-26 PROCEDURE — 86618 LYME DISEASE ANTIBODY: CPT | Performed by: FAMILY MEDICINE

## 2024-11-26 PROCEDURE — 86140 C-REACTIVE PROTEIN: CPT | Performed by: FAMILY MEDICINE

## 2024-11-26 PROCEDURE — 87086 URINE CULTURE/COLONY COUNT: CPT | Performed by: FAMILY MEDICINE

## 2024-11-26 PROCEDURE — 80053 COMPREHEN METABOLIC PANEL: CPT | Performed by: FAMILY MEDICINE

## 2024-11-26 PROCEDURE — 85652 RBC SED RATE AUTOMATED: CPT | Performed by: FAMILY MEDICINE

## 2024-11-26 PROCEDURE — 84443 ASSAY THYROID STIM HORMONE: CPT | Performed by: FAMILY MEDICINE

## 2024-11-26 PROCEDURE — 81001 URINALYSIS AUTO W/SCOPE: CPT | Performed by: FAMILY MEDICINE

## 2024-11-26 PROCEDURE — 36415 COLL VENOUS BLD VENIPUNCTURE: CPT | Performed by: FAMILY MEDICINE

## 2024-11-26 PROCEDURE — 83036 HEMOGLOBIN GLYCOSYLATED A1C: CPT | Performed by: FAMILY MEDICINE

## 2024-11-26 ASSESSMENT — PAIN SCALES - GENERAL: PAINLEVEL_OUTOF10: NO PAIN (0)

## 2024-11-26 ASSESSMENT — ENCOUNTER SYMPTOMS: FATIGUE: 1

## 2024-11-26 NOTE — PROGRESS NOTES
"  {PROVIDER CHARTING PREFERENCE:994739}    Subjective   David is a 54 year old, presenting for the following health issues:  Fatigue        11/26/2024    10:48 AM   Additional Questions   Roomed by Rosenda VIEIRA     History of Present Illness       Reason for visit:  Tired, pain in feet, pains  Symptom onset:  More than a month  Symptom intensity:  Moderate  Symptom progression:  Staying the same  Had these symptoms before:  No   She is taking medications regularly.         Burning in the eyes after a meal two weeks ago , fatigue with walking , tachycardia , feet pain and edema , when walking first thing in the am heel pain   No period for more than a yr , hot flashes       {ROS Picklists (Optional):103851}      Objective    /79   Pulse 63   Temp 97.1  F (36.2  C) (Temporal)   Resp 18   Ht 1.626 m (5' 4\")   Wt 77.9 kg (171 lb 12.8 oz)   LMP 12/02/2019   SpO2 96%   BMI 29.49 kg/m    Body mass index is 29.49 kg/m .  Physical Exam   {Exam List (Optional):067686}    {Diagnostic Test Results (Optional):446471}        Signed Electronically by: Kenna Soto MD  {Email feedback regarding this note to [email or web address]   :[id number]}  "

## 2024-11-27 LAB
ALBUMIN SERPL BCG-MCNC: 4.2 G/DL (ref 3.5–5.2)
ALP SERPL-CCNC: 79 U/L (ref 40–150)
ALT SERPL W P-5'-P-CCNC: 23 U/L (ref 0–50)
ANION GAP SERPL CALCULATED.3IONS-SCNC: 12 MMOL/L (ref 7–15)
AST SERPL W P-5'-P-CCNC: 20 U/L (ref 0–45)
BACTERIA UR CULT: NORMAL
BILIRUB SERPL-MCNC: 0.6 MG/DL
BUN SERPL-MCNC: 10.1 MG/DL (ref 6–20)
CALCIUM SERPL-MCNC: 9.3 MG/DL (ref 8.8–10.4)
CHLORIDE SERPL-SCNC: 106 MMOL/L (ref 98–107)
CREAT SERPL-MCNC: 0.92 MG/DL (ref 0.51–0.95)
CRP SERPL-MCNC: 7.5 MG/L
EGFRCR SERPLBLD CKD-EPI 2021: 73 ML/MIN/1.73M2
GLUCOSE SERPL-MCNC: 83 MG/DL (ref 70–99)
HCO3 SERPL-SCNC: 23 MMOL/L (ref 22–29)
POTASSIUM SERPL-SCNC: 4 MMOL/L (ref 3.4–5.3)
PROT SERPL-MCNC: 7.2 G/DL (ref 6.4–8.3)
SODIUM SERPL-SCNC: 141 MMOL/L (ref 135–145)
TSH SERPL DL<=0.005 MIU/L-ACNC: 2.77 UIU/ML (ref 0.3–4.2)

## 2024-11-30 LAB
ANA PAT SER IF-IMP: ABNORMAL
ANA SER QL IF: POSITIVE
ANA TITR SER IF: ABNORMAL {TITER}

## 2024-12-11 ENCOUNTER — PATIENT OUTREACH (OUTPATIENT)
Dept: CARE COORDINATION | Facility: CLINIC | Age: 54
End: 2024-12-11
Payer: COMMERCIAL

## 2024-12-25 ENCOUNTER — PATIENT OUTREACH (OUTPATIENT)
Dept: CARE COORDINATION | Facility: CLINIC | Age: 54
End: 2024-12-25
Payer: COMMERCIAL

## 2025-02-23 ENCOUNTER — HEALTH MAINTENANCE LETTER (OUTPATIENT)
Age: 55
End: 2025-02-23

## 2025-07-09 ENCOUNTER — PATIENT OUTREACH (OUTPATIENT)
Dept: CARE COORDINATION | Facility: CLINIC | Age: 55
End: 2025-07-09
Payer: COMMERCIAL

## 2025-08-01 ENCOUNTER — OFFICE VISIT (OUTPATIENT)
Dept: FAMILY MEDICINE | Facility: CLINIC | Age: 55
End: 2025-08-01
Payer: COMMERCIAL

## 2025-08-01 VITALS
SYSTOLIC BLOOD PRESSURE: 125 MMHG | TEMPERATURE: 97.2 F | RESPIRATION RATE: 16 BRPM | WEIGHT: 168 LBS | OXYGEN SATURATION: 96 % | DIASTOLIC BLOOD PRESSURE: 87 MMHG | BODY MASS INDEX: 28.68 KG/M2 | HEIGHT: 64 IN | HEART RATE: 64 BPM

## 2025-08-01 DIAGNOSIS — R30.0 DYSURIA: ICD-10-CM

## 2025-08-01 DIAGNOSIS — R53.83 FATIGUE, UNSPECIFIED TYPE: ICD-10-CM

## 2025-08-01 DIAGNOSIS — R73.01 ELEVATED FASTING GLUCOSE: ICD-10-CM

## 2025-08-01 DIAGNOSIS — R07.89 CHEST DISCOMFORT: Primary | ICD-10-CM

## 2025-08-01 DIAGNOSIS — R13.10 DYSPHAGIA, UNSPECIFIED TYPE: ICD-10-CM

## 2025-08-01 LAB
ALBUMIN SERPL BCG-MCNC: 4.3 G/DL (ref 3.5–5.2)
ALBUMIN UR-MCNC: NEGATIVE MG/DL
ALP SERPL-CCNC: 71 U/L (ref 40–150)
ALT SERPL W P-5'-P-CCNC: 24 U/L (ref 0–50)
ANION GAP SERPL CALCULATED.3IONS-SCNC: 10 MMOL/L (ref 7–15)
APPEARANCE UR: CLEAR
AST SERPL W P-5'-P-CCNC: 23 U/L (ref 0–45)
BACTERIA #/AREA URNS HPF: ABNORMAL /HPF
BILIRUB SERPL-MCNC: 0.8 MG/DL
BILIRUB UR QL STRIP: NEGATIVE
BUN SERPL-MCNC: 14.2 MG/DL (ref 6–20)
CALCIUM SERPL-MCNC: 9.3 MG/DL (ref 8.8–10.4)
CHLORIDE SERPL-SCNC: 107 MMOL/L (ref 98–107)
COLOR UR AUTO: YELLOW
CREAT SERPL-MCNC: 0.96 MG/DL (ref 0.51–0.95)
CRP SERPL-MCNC: 9.3 MG/L
EGFRCR SERPLBLD CKD-EPI 2021: 69 ML/MIN/1.73M2
ERYTHROCYTE [DISTWIDTH] IN BLOOD BY AUTOMATED COUNT: 12.3 % (ref 10–15)
EST. AVERAGE GLUCOSE BLD GHB EST-MCNC: 103 MG/DL
GLUCOSE SERPL-MCNC: 86 MG/DL (ref 70–99)
GLUCOSE UR STRIP-MCNC: NEGATIVE MG/DL
HBA1C MFR BLD: 5.2 % (ref 0–5.6)
HCO3 SERPL-SCNC: 23 MMOL/L (ref 22–29)
HCT VFR BLD AUTO: 41 % (ref 35–47)
HGB BLD-MCNC: 14 G/DL (ref 11.7–15.7)
HGB UR QL STRIP: NEGATIVE
KETONES UR STRIP-MCNC: NEGATIVE MG/DL
LEUKOCYTE ESTERASE UR QL STRIP: ABNORMAL
MCH RBC QN AUTO: 29.5 PG (ref 26.5–33)
MCHC RBC AUTO-ENTMCNC: 34.1 G/DL (ref 31.5–36.5)
MCV RBC AUTO: 86 FL (ref 78–100)
NITRATE UR QL: NEGATIVE
PH UR STRIP: 6.5 [PH] (ref 5–7)
PLATELET # BLD AUTO: 274 10E3/UL (ref 150–450)
POTASSIUM SERPL-SCNC: 4.2 MMOL/L (ref 3.4–5.3)
PROT SERPL-MCNC: 7.4 G/DL (ref 6.4–8.3)
RBC # BLD AUTO: 4.75 10E6/UL (ref 3.8–5.2)
RBC #/AREA URNS AUTO: ABNORMAL /HPF
SODIUM SERPL-SCNC: 140 MMOL/L (ref 135–145)
SP GR UR STRIP: 1.01 (ref 1–1.03)
SQUAMOUS #/AREA URNS AUTO: ABNORMAL /LPF
UROBILINOGEN UR STRIP-ACNC: 0.2 E.U./DL
WBC # BLD AUTO: 6 10E3/UL (ref 4–11)
WBC #/AREA URNS AUTO: ABNORMAL /HPF

## 2025-08-01 PROCEDURE — 3079F DIAST BP 80-89 MM HG: CPT | Performed by: FAMILY MEDICINE

## 2025-08-01 PROCEDURE — 83036 HEMOGLOBIN GLYCOSYLATED A1C: CPT | Performed by: FAMILY MEDICINE

## 2025-08-01 PROCEDURE — 86140 C-REACTIVE PROTEIN: CPT | Performed by: FAMILY MEDICINE

## 2025-08-01 PROCEDURE — 1125F AMNT PAIN NOTED PAIN PRSNT: CPT | Performed by: FAMILY MEDICINE

## 2025-08-01 PROCEDURE — 99214 OFFICE O/P EST MOD 30 MIN: CPT | Performed by: FAMILY MEDICINE

## 2025-08-01 PROCEDURE — 85027 COMPLETE CBC AUTOMATED: CPT | Performed by: FAMILY MEDICINE

## 2025-08-01 PROCEDURE — 86039 ANTINUCLEAR ANTIBODIES (ANA): CPT | Performed by: FAMILY MEDICINE

## 2025-08-01 PROCEDURE — 3074F SYST BP LT 130 MM HG: CPT | Performed by: FAMILY MEDICINE

## 2025-08-01 PROCEDURE — 36415 COLL VENOUS BLD VENIPUNCTURE: CPT | Performed by: FAMILY MEDICINE

## 2025-08-01 PROCEDURE — 80053 COMPREHEN METABOLIC PANEL: CPT | Performed by: FAMILY MEDICINE

## 2025-08-01 PROCEDURE — 93000 ELECTROCARDIOGRAM COMPLETE: CPT | Performed by: FAMILY MEDICINE

## 2025-08-01 PROCEDURE — 81001 URINALYSIS AUTO W/SCOPE: CPT | Performed by: FAMILY MEDICINE

## 2025-08-01 PROCEDURE — 86038 ANTINUCLEAR ANTIBODIES: CPT | Performed by: FAMILY MEDICINE

## 2025-08-01 PROCEDURE — 3044F HG A1C LEVEL LT 7.0%: CPT | Performed by: FAMILY MEDICINE

## 2025-08-01 ASSESSMENT — ANXIETY QUESTIONNAIRES
3. WORRYING TOO MUCH ABOUT DIFFERENT THINGS: SEVERAL DAYS
GAD7 TOTAL SCORE: 6
IF YOU CHECKED OFF ANY PROBLEMS ON THIS QUESTIONNAIRE, HOW DIFFICULT HAVE THESE PROBLEMS MADE IT FOR YOU TO DO YOUR WORK, TAKE CARE OF THINGS AT HOME, OR GET ALONG WITH OTHER PEOPLE: NOT DIFFICULT AT ALL
1. FEELING NERVOUS, ANXIOUS, OR ON EDGE: SEVERAL DAYS
7. FEELING AFRAID AS IF SOMETHING AWFUL MIGHT HAPPEN: SEVERAL DAYS
5. BEING SO RESTLESS THAT IT IS HARD TO SIT STILL: NOT AT ALL
6. BECOMING EASILY ANNOYED OR IRRITABLE: SEVERAL DAYS
GAD7 TOTAL SCORE: 6
8. IF YOU CHECKED OFF ANY PROBLEMS, HOW DIFFICULT HAVE THESE MADE IT FOR YOU TO DO YOUR WORK, TAKE CARE OF THINGS AT HOME, OR GET ALONG WITH OTHER PEOPLE?: NOT DIFFICULT AT ALL
4. TROUBLE RELAXING: SEVERAL DAYS
GAD7 TOTAL SCORE: 6
7. FEELING AFRAID AS IF SOMETHING AWFUL MIGHT HAPPEN: SEVERAL DAYS
2. NOT BEING ABLE TO STOP OR CONTROL WORRYING: SEVERAL DAYS

## 2025-08-01 ASSESSMENT — PATIENT HEALTH QUESTIONNAIRE - PHQ9
SUM OF ALL RESPONSES TO PHQ QUESTIONS 1-9: 7
10. IF YOU CHECKED OFF ANY PROBLEMS, HOW DIFFICULT HAVE THESE PROBLEMS MADE IT FOR YOU TO DO YOUR WORK, TAKE CARE OF THINGS AT HOME, OR GET ALONG WITH OTHER PEOPLE: SOMEWHAT DIFFICULT
SUM OF ALL RESPONSES TO PHQ QUESTIONS 1-9: 7

## 2025-08-01 ASSESSMENT — PAIN SCALES - GENERAL: PAINLEVEL_OUTOF10: MODERATE PAIN (4)

## 2025-08-04 ENCOUNTER — PATIENT OUTREACH (OUTPATIENT)
Dept: CARE COORDINATION | Facility: CLINIC | Age: 55
End: 2025-08-04
Payer: COMMERCIAL

## 2025-08-06 ENCOUNTER — PATIENT OUTREACH (OUTPATIENT)
Dept: CARE COORDINATION | Facility: CLINIC | Age: 55
End: 2025-08-06
Payer: COMMERCIAL

## 2025-08-07 LAB
ANA PAT SER IF-IMP: ABNORMAL
ANA SER QL IF: POSITIVE
ANA TITR SER IF: ABNORMAL {TITER}

## 2025-08-11 ENCOUNTER — PATIENT OUTREACH (OUTPATIENT)
Dept: CARE COORDINATION | Facility: CLINIC | Age: 55
End: 2025-08-11
Payer: COMMERCIAL

## 2025-08-14 ENCOUNTER — OFFICE VISIT (OUTPATIENT)
Dept: CARDIOLOGY | Facility: CLINIC | Age: 55
End: 2025-08-14
Payer: COMMERCIAL

## 2025-08-14 VITALS
OXYGEN SATURATION: 96 % | WEIGHT: 171.1 LBS | DIASTOLIC BLOOD PRESSURE: 80 MMHG | SYSTOLIC BLOOD PRESSURE: 116 MMHG | HEART RATE: 69 BPM | HEIGHT: 63 IN | BODY MASS INDEX: 30.32 KG/M2

## 2025-08-14 DIAGNOSIS — R07.89 CHEST DISCOMFORT: ICD-10-CM

## 2025-08-14 DIAGNOSIS — R06.09 DYSPNEA ON EXERTION: Primary | ICD-10-CM

## 2025-08-27 ENCOUNTER — HOSPITAL ENCOUNTER (OUTPATIENT)
Dept: CARDIOLOGY | Facility: CLINIC | Age: 55
Discharge: HOME | End: 2025-08-27
Attending: INTERNAL MEDICINE
Payer: COMMERCIAL

## 2025-08-27 DIAGNOSIS — R06.09 DYSPNEA ON EXERTION: ICD-10-CM

## 2025-08-27 PROCEDURE — 93325 DOPPLER ECHO COLOR FLOW MAPG: CPT | Mod: TC

## 2025-09-01 ENCOUNTER — ANCILLARY PROCEDURE (OUTPATIENT)
Dept: GENERAL RADIOLOGY | Facility: CLINIC | Age: 55
End: 2025-09-01
Attending: EMERGENCY MEDICINE
Payer: COMMERCIAL

## 2025-09-01 ENCOUNTER — OFFICE VISIT (OUTPATIENT)
Dept: URGENT CARE | Facility: URGENT CARE | Age: 55
End: 2025-09-01
Payer: COMMERCIAL

## 2025-09-01 VITALS
RESPIRATION RATE: 16 BRPM | OXYGEN SATURATION: 96 % | HEART RATE: 65 BPM | BODY MASS INDEX: 29.77 KG/M2 | TEMPERATURE: 97.7 F | WEIGHT: 168 LBS | HEIGHT: 63 IN | SYSTOLIC BLOOD PRESSURE: 120 MMHG | DIASTOLIC BLOOD PRESSURE: 80 MMHG

## 2025-09-01 DIAGNOSIS — S92.535A CLOSED NONDISPLACED FRACTURE OF DISTAL PHALANX OF LESSER TOE OF LEFT FOOT, INITIAL ENCOUNTER: Primary | ICD-10-CM

## 2025-09-01 DIAGNOSIS — S99.922A FOOT INJURY, LEFT, INITIAL ENCOUNTER: ICD-10-CM

## 2025-09-01 PROCEDURE — 3079F DIAST BP 80-89 MM HG: CPT | Performed by: EMERGENCY MEDICINE

## 2025-09-01 PROCEDURE — 73660 X-RAY EXAM OF TOE(S): CPT | Mod: TC | Performed by: RADIOLOGY

## 2025-09-01 PROCEDURE — 99214 OFFICE O/P EST MOD 30 MIN: CPT | Performed by: EMERGENCY MEDICINE

## 2025-09-01 PROCEDURE — 3074F SYST BP LT 130 MM HG: CPT | Performed by: EMERGENCY MEDICINE
